# Patient Record
Sex: FEMALE | Race: BLACK OR AFRICAN AMERICAN | NOT HISPANIC OR LATINO | Employment: UNEMPLOYED | URBAN - METROPOLITAN AREA
[De-identification: names, ages, dates, MRNs, and addresses within clinical notes are randomized per-mention and may not be internally consistent; named-entity substitution may affect disease eponyms.]

---

## 2017-11-20 ENCOUNTER — TRANSCRIBE ORDERS (OUTPATIENT)
Dept: URGENT CARE | Facility: MEDICAL CENTER | Age: 22
End: 2017-11-20

## 2017-11-20 ENCOUNTER — APPOINTMENT (OUTPATIENT)
Dept: LAB | Facility: MEDICAL CENTER | Age: 22
End: 2017-11-20

## 2017-11-20 ENCOUNTER — APPOINTMENT (OUTPATIENT)
Dept: URGENT CARE | Facility: MEDICAL CENTER | Age: 22
End: 2017-11-20

## 2017-11-20 DIAGNOSIS — Z02.1 PHYSICAL EXAM, PRE-EMPLOYMENT: ICD-10-CM

## 2017-11-20 DIAGNOSIS — Z02.1 PHYSICAL EXAM, PRE-EMPLOYMENT: Primary | ICD-10-CM

## 2017-11-20 PROCEDURE — 86480 TB TEST CELL IMMUN MEASURE: CPT

## 2017-11-20 PROCEDURE — 36415 COLL VENOUS BLD VENIPUNCTURE: CPT

## 2017-11-22 LAB
ANNOTATION COMMENT IMP: NORMAL
GAMMA INTERFERON BACKGROUND BLD IA-ACNC: 0.04 IU/ML
M TB IFN-G BLD-IMP: NEGATIVE
M TB IFN-G CD4+ BCKGRND COR BLD-ACNC: 0.01 IU/ML
M TB IFN-G CD4+ T-CELLS BLD-ACNC: 0.05 IU/ML
MITOGEN IGNF BLD-ACNC: >10 IU/ML
QUANTIFERON-TB GOLD IN TUBE: NORMAL
SERVICE CMNT-IMP: NORMAL

## 2019-05-29 ENCOUNTER — HOSPITAL ENCOUNTER (EMERGENCY)
Facility: HOSPITAL | Age: 24
Discharge: HOME | End: 2019-05-29
Attending: EMERGENCY MEDICINE | Admitting: EMERGENCY MEDICINE

## 2019-05-29 VITALS
WEIGHT: 209.1 LBS | SYSTOLIC BLOOD PRESSURE: 127 MMHG | TEMPERATURE: 98.3 F | DIASTOLIC BLOOD PRESSURE: 70 MMHG | OXYGEN SATURATION: 100 % | RESPIRATION RATE: 20 BRPM | HEART RATE: 82 BPM

## 2019-05-29 DIAGNOSIS — N39.0 UTI (URINARY TRACT INFECTION): Primary | ICD-10-CM

## 2019-05-29 LAB
BACTERIA UR QL AUTO: ABNORMAL /HPF
BILIRUB UR QL STRIP: ABNORMAL
CLARITY UR: ABNORMAL
COLOR UR: ABNORMAL
GLUCOSE UR STRIP-MCNC: NEGATIVE MG/DL
HGB UR QL STRIP.AUTO: 250
KETONES UR STRIP-MCNC: NEGATIVE MG/DL
LEUKOCYTE ESTERASE UR QL STRIP: NEGATIVE
NITRITE UR QL STRIP: POSITIVE
NON-SQ EPI CELLS URNS QL MICRO: ABNORMAL /HPF
PH UR STRIP.AUTO: 5 [PH]
PROT UR STRIP-MCNC: >=500 MG/DL
RBC #/AREA URNS AUTO: ABNORMAL /HPF
SP GR UR STRIP.AUTO: 1.01 (ref 1–1.04)
UROBILINOGEN UA: 8 MG/DL
WBC #/AREA URNS AUTO: ABNORMAL /HPF

## 2019-05-29 PROCEDURE — 99283 EMERGENCY DEPT VISIT LOW MDM: CPT

## 2019-05-29 PROCEDURE — 81003 URINALYSIS AUTO W/O SCOPE: CPT | Performed by: PHYSICIAN ASSISTANT

## 2019-05-29 PROCEDURE — 81001 URINALYSIS AUTO W/SCOPE: CPT | Performed by: PHYSICIAN ASSISTANT

## 2019-05-29 PROCEDURE — 99283 EMERGENCY DEPT VISIT LOW MDM: CPT | Performed by: PHYSICIAN ASSISTANT

## 2019-05-29 RX ORDER — PAROXETINE 30 MG/1
30 TABLET, FILM COATED ORAL EVERY MORNING
COMMUNITY
End: 2019-09-05 | Stop reason: ALTCHOICE

## 2019-05-29 RX ORDER — NITROFURANTOIN 25; 75 MG/1; MG/1
100 CAPSULE ORAL 2 TIMES DAILY
Qty: 10 CAPSULE | Refills: 0 | Status: SHIPPED | OUTPATIENT
Start: 2019-05-29 | End: 2019-09-05 | Stop reason: ALTCHOICE

## 2019-09-05 ENCOUNTER — OFFICE VISIT (OUTPATIENT)
Dept: FAMILY MEDICINE CLINIC | Facility: CLINIC | Age: 24
End: 2019-09-05
Payer: COMMERCIAL

## 2019-09-05 VITALS
DIASTOLIC BLOOD PRESSURE: 70 MMHG | OXYGEN SATURATION: 98 % | SYSTOLIC BLOOD PRESSURE: 100 MMHG | WEIGHT: 198 LBS | HEART RATE: 88 BPM | HEIGHT: 66 IN | BODY MASS INDEX: 31.82 KG/M2 | TEMPERATURE: 99.7 F

## 2019-09-05 DIAGNOSIS — Z13.6 SCREENING FOR HYPERTENSION: ICD-10-CM

## 2019-09-05 DIAGNOSIS — Z13.29 SCREENING FOR THYROID DISORDER: ICD-10-CM

## 2019-09-05 DIAGNOSIS — Z23 NEED FOR TDAP VACCINATION: ICD-10-CM

## 2019-09-05 DIAGNOSIS — Z11.1 SCREENING FOR TUBERCULOSIS: ICD-10-CM

## 2019-09-05 DIAGNOSIS — Z13.220 ENCOUNTER FOR SCREENING FOR LIPID DISORDER: ICD-10-CM

## 2019-09-05 DIAGNOSIS — Z00.01 ENCOUNTER FOR WELL ADULT EXAM WITH ABNORMAL FINDINGS: Primary | ICD-10-CM

## 2019-09-05 DIAGNOSIS — Z13.0 SCREENING FOR IRON DEFICIENCY ANEMIA: ICD-10-CM

## 2019-09-05 DIAGNOSIS — Z23 NEED FOR INFLUENZA VACCINATION: ICD-10-CM

## 2019-09-05 PROBLEM — F41.9 ANXIETY: Status: ACTIVE | Noted: 2019-09-05

## 2019-09-05 PROCEDURE — 90472 IMMUNIZATION ADMIN EACH ADD: CPT | Performed by: FAMILY MEDICINE

## 2019-09-05 PROCEDURE — 90471 IMMUNIZATION ADMIN: CPT | Performed by: FAMILY MEDICINE

## 2019-09-05 PROCEDURE — 90682 RIV4 VACC RECOMBINANT DNA IM: CPT | Performed by: FAMILY MEDICINE

## 2019-09-05 PROCEDURE — 99385 PREV VISIT NEW AGE 18-39: CPT | Performed by: FAMILY MEDICINE

## 2019-09-05 PROCEDURE — 90715 TDAP VACCINE 7 YRS/> IM: CPT | Performed by: FAMILY MEDICINE

## 2019-09-05 NOTE — PROGRESS NOTES
FAMILY [facility] HEALTH MAINTENANCE OFFICE VISIT  [facility] [place] [facility]    NAME: Suyapa Isabel  AGE: 22 y o  SEX: female  : 1995     DATE: 2019    Assessment and Plan     Problem List Items Addressed This Visit        Other    Encounter for well adult exam with abnormal findings - Primary     Advice and education were given regarding nutrition, aerobic exercises, weight bearing exercises, cardiovascular risk reduction, fall risk reduction, and age appropriate supplements  The patient was counseled regarding instructions for management, risk factor reductions, prognosis, risks and benefits of treatment options, patient and family education, and importance of compliance with treatment  Patient will have a Tdap the and flu shot today possible side effect discussed with the patient         Relevant Orders    CBC and differential    Basic metabolic panel    Lipid Panel with Direct LDL reflex    TSH, 3rd generation with Free T4 reflex    BMI 32 0-32 9,adult     The BMI is above average  BMI counseling and education was provided to the patient  Nutrition recommendations include reducing portion sizes, decreasing overall calorie intake, 3-5 servings of fruits/vegetables daily, reducing fast food intake, consuming healthier snacks, decreasing soda and/or juice intake, moderation in carbohydrate intake and reducing intake of saturated fat and trans fat  Exercise recommendations include moderate aerobic physical activity for 150 minutes/week, exercising 3-5 times per week and joining a gym           Relevant Orders    CBC and differential    Basic metabolic panel    Lipid Panel with Direct LDL reflex    TSH, 3rd generation with Free T4 reflex      Other Visit Diagnoses     Screening for tuberculosis        Relevant Orders    Quantiferon TB Gold Plus    Screening for thyroid disorder        Relevant Orders    CBC and differential    Basic metabolic panel Lipid Panel with Direct LDL reflex    TSH, 3rd generation with Free T4 reflex    Screening for iron deficiency anemia        Relevant Orders    CBC and differential    Basic metabolic panel    Lipid Panel with Direct LDL reflex    TSH, 3rd generation with Free T4 reflex    Screening for hypertension        Relevant Orders    CBC and differential    Basic metabolic panel    Lipid Panel with Direct LDL reflex    TSH, 3rd generation with Free T4 reflex    Encounter for screening for lipid disorder        Relevant Orders    CBC and differential    Basic metabolic panel    Lipid Panel with Direct LDL reflex    TSH, 3rd generation with Free T4 reflex    Need for Tdap vaccination        Relevant Orders    TDAP VACCINE GREATER THAN OR EQUAL TO 8YO IM (Completed)    Need for influenza vaccination        Relevant Orders    PREFERRED: influenza vaccine, [id number], quadrivalent, recombinant, PF, 0 5 mL (FLUBLOK) (Completed)            · Patient Counseling:   · Nutrition: Stressed importance of a well balanced diet, moderation of sodium/saturated fat, caloric balance and sufficient intake of fiber  · Exercise: Stressed the importance of regular exercise with a goal of 150 minutes per week  · Dental Health: Discussed daily flossing and brushing and regular dental visits     · Immunizations reviewed: Risks and Benefits discussed  · Discussed benefits of:  Cervical Cancer screening and Screening labs   BMI Counseling: Body mass index is 32 45 kg/m²  Discussed with patient's BMI with her       Chief Complaint     Chief Complaint   Patient presents with    Physical Exam       History of Present Illness     Patient here for annual physical exam deny any chest pain short of breath no palpitation no headache no blurred vision no weakness or lateralized of the symptom no abdomen pain nausea vomiting or diarrhea no renal problem no fever no change in the weight and no change in the mood no cough no wheezing deny smoking she does do exercises twice a months and she does not do any special diet      Well Adult Physical   Patient here for a comprehensive physical exam       Diet and Physical Activity  Diet: Regular diet  Exercise: intermittently      Depression Screen  PHQ-9 Depression Screening    PHQ-9:    Frequency of the following problems over the past two weeks:       Little interest or pleasure in doing things:  0 - not at all  Feeling down, depressed, or hopeless:  0 - not at all  PHQ-2 Score:  0          General Health  Hearing: Normal:  bilateral  Vision: no vision problems  Dental: no dental visits for >1 year and brushes teeth once daily    Reproductive Health  Regular Periods      The following portions of the patient's history were reviewed and updated as appropriate: allergies, current medications, past family history, past medical history, past social history, past surgical history and problem list     Review of Systems     Review of Systems   Constitutional: Negative for fatigue and fever  HENT: Negative for ear pain, sinus pressure, sinus pain and sore throat  Eyes: Negative for pain and redness  Respiratory: Negative for cough, chest tightness and shortness of breath  Cardiovascular: Negative for chest pain, palpitations and leg swelling  Gastrointestinal: Negative for abdominal pain, blood in stool, constipation, diarrhea and nausea  Endocrine: Negative for heat intolerance and polydipsia  Genitourinary: Negative for flank pain, frequency and hematuria  Musculoskeletal: Negative for back pain and joint swelling  Skin: Negative for rash  Neurological: Negative for dizziness, numbness and headaches  Hematological: Does not bruise/bleed easily  Psychiatric/Behavioral: Negative for agitation and behavioral problems         Past Medical History     Past Medical History:   Diagnosis Date    Anxiety 9/5/2019    Obesity        Past Surgical History     Past Surgical History:   Procedure Laterality Date    ELBOW SURGERY Right        Social History     Social History     Socioeconomic History    Marital status: Single     Spouse name: None    Number of children: None    Years of education: None    Highest education level: None   Occupational History    None   Social Needs    Financial resource strain: Not hard at all   Eliud-Liza insecurity:     Worry: Never true     Inability: Never true    Transportation needs:     Medical: No     Non-medical: No   Tobacco Use    Smoking status: Never Smoker    Smokeless tobacco: Never Used   Substance and Sexual Activity    Alcohol use: Yes     Frequency: Never     Comment: socially    Drug use: Never    Sexual activity: Yes     Partners: Male   Lifestyle    Physical activity:     Days per week: 0 days     Minutes per session: 0 min    Stress: Rather much   Relationships    Social connections:     Talks on phone: More than three times a week     Gets together: Once a week     Attends Rastafari service: Never     Active member of club or organization: No     Attends meetings of clubs or organizations: Never     Relationship status: Never     Intimate partner violence:     Fear of current or ex partner: No     Emotionally abused: No     Physically abused: No     Forced sexual activity: No   Other Topics Concern    None   Social History Narrative    None       Family History     History reviewed  No pertinent family history  Current Medications     No current outpatient medications on file  Allergies     No Known Allergies    Objective     /70   Pulse 88   Temp 99 7 °F (37 6 °C) (Tympanic)   Ht 5' 5 5" (1 664 m)   Wt 89 8 kg (198 lb)   LMP 08/15/2019 (Approximate)   SpO2 98%   Breastfeeding? No   BMI 32 45 kg/m²      Physical Exam   Constitutional: She is oriented to person, place, and time  She appears well-developed and well-nourished  HENT:   Head: Normocephalic     Right Ear: External ear normal    Left Ear: External ear normal    Eyes: Conjunctivae and EOM are normal  Right eye exhibits no discharge  Left eye exhibits no discharge  Neck: No JVD present  Cardiovascular: Normal rate, regular rhythm and normal heart sounds  Exam reveals no gallop  No murmur heard  Pulmonary/Chest: Effort normal  No respiratory distress  She has no wheezes  She has no rales  She exhibits no tenderness  Abdominal: She exhibits no mass  There is no tenderness  There is no rebound  Musculoskeletal: She exhibits no edema or tenderness  Neurological: She is alert and oriented to person, place, and time  Skin: No rash noted  No erythema  Psychiatric: She has a normal mood and affect  Jose Estrada MD  [place] [facility]  BMI Counseling: Body mass index is 32 45 kg/m²  Discussed the patient's BMI with her  The BMI is above average  BMI counseling and education was provided to the patient  Nutrition recommendations include reducing portion sizes, decreasing overall calorie intake, 3-5 servings of fruits/vegetables daily and reducing fast food intake  Exercise recommendations include moderate aerobic physical activity for 150 minutes/week

## 2019-09-05 NOTE — PATIENT INSTRUCTIONS

## 2019-09-05 NOTE — ASSESSMENT & PLAN NOTE
Advice and education were given regarding nutrition, aerobic exercises, weight bearing exercises, cardiovascular risk reduction, fall risk reduction, and age appropriate supplements  The patient was counseled regarding instructions for management, risk factor reductions, prognosis, risks and benefits of treatment options, patient and family education, and importance of compliance with treatment       Patient will have a Tdap the and flu shot today possible side effect discussed with the patient

## 2019-09-20 ENCOUNTER — TRANSCRIBE ORDERS (OUTPATIENT)
Dept: LAB | Facility: CLINIC | Age: 24
End: 2019-09-20

## 2019-09-20 ENCOUNTER — APPOINTMENT (OUTPATIENT)
Dept: LAB | Facility: CLINIC | Age: 24
End: 2019-09-20
Payer: COMMERCIAL

## 2019-09-20 DIAGNOSIS — Z13.6 SCREENING FOR HYPERTENSION: ICD-10-CM

## 2019-09-20 DIAGNOSIS — Z13.29 SCREENING FOR THYROID DISORDER: ICD-10-CM

## 2019-09-20 DIAGNOSIS — Z11.1 SCREENING FOR TUBERCULOSIS: ICD-10-CM

## 2019-09-20 DIAGNOSIS — Z13.220 ENCOUNTER FOR SCREENING FOR LIPID DISORDER: ICD-10-CM

## 2019-09-20 DIAGNOSIS — Z13.0 SCREENING FOR IRON DEFICIENCY ANEMIA: ICD-10-CM

## 2019-09-20 DIAGNOSIS — Z00.01 ENCOUNTER FOR WELL ADULT EXAM WITH ABNORMAL FINDINGS: ICD-10-CM

## 2019-09-20 LAB
ANION GAP SERPL CALCULATED.3IONS-SCNC: 5 MMOL/L (ref 4–13)
BASOPHILS # BLD AUTO: 0.02 THOUSANDS/ΜL (ref 0–0.1)
BASOPHILS NFR BLD AUTO: 0 % (ref 0–1)
BUN SERPL-MCNC: 21 MG/DL (ref 5–25)
CALCIUM SERPL-MCNC: 9.7 MG/DL (ref 8.3–10.1)
CHLORIDE SERPL-SCNC: 108 MMOL/L (ref 100–108)
CHOLEST SERPL-MCNC: 137 MG/DL (ref 50–200)
CO2 SERPL-SCNC: 27 MMOL/L (ref 21–32)
CREAT SERPL-MCNC: 0.8 MG/DL (ref 0.6–1.3)
EOSINOPHIL # BLD AUTO: 0.06 THOUSAND/ΜL (ref 0–0.61)
EOSINOPHIL NFR BLD AUTO: 1 % (ref 0–6)
ERYTHROCYTE [DISTWIDTH] IN BLOOD BY AUTOMATED COUNT: 12 % (ref 11.6–15.1)
GFR SERPL CREATININE-BSD FRML MDRD: 120 ML/MIN/1.73SQ M
GLUCOSE P FAST SERPL-MCNC: 82 MG/DL (ref 65–99)
HCT VFR BLD AUTO: 40.2 % (ref 34.8–46.1)
HDLC SERPL-MCNC: 56 MG/DL (ref 40–60)
HGB BLD-MCNC: 13 G/DL (ref 11.5–15.4)
IMM GRANULOCYTES # BLD AUTO: 0.01 THOUSAND/UL (ref 0–0.2)
IMM GRANULOCYTES NFR BLD AUTO: 0 % (ref 0–2)
LDLC SERPL CALC-MCNC: 74 MG/DL (ref 0–100)
LYMPHOCYTES # BLD AUTO: 2.39 THOUSANDS/ΜL (ref 0.6–4.47)
LYMPHOCYTES NFR BLD AUTO: 37 % (ref 14–44)
MCH RBC QN AUTO: 30.2 PG (ref 26.8–34.3)
MCHC RBC AUTO-ENTMCNC: 32.3 G/DL (ref 31.4–37.4)
MCV RBC AUTO: 93 FL (ref 82–98)
MONOCYTES # BLD AUTO: 0.41 THOUSAND/ΜL (ref 0.17–1.22)
MONOCYTES NFR BLD AUTO: 6 % (ref 4–12)
NEUTROPHILS # BLD AUTO: 3.53 THOUSANDS/ΜL (ref 1.85–7.62)
NEUTS SEG NFR BLD AUTO: 56 % (ref 43–75)
NRBC BLD AUTO-RTO: 0 /100 WBCS
PLATELET # BLD AUTO: 266 THOUSANDS/UL (ref 149–390)
PMV BLD AUTO: 10.5 FL (ref 8.9–12.7)
POTASSIUM SERPL-SCNC: 4.2 MMOL/L (ref 3.5–5.3)
RBC # BLD AUTO: 4.31 MILLION/UL (ref 3.81–5.12)
SODIUM SERPL-SCNC: 140 MMOL/L (ref 136–145)
TRIGL SERPL-MCNC: 36 MG/DL
TSH SERPL DL<=0.05 MIU/L-ACNC: 1.09 UIU/ML (ref 0.36–3.74)
WBC # BLD AUTO: 6.42 THOUSAND/UL (ref 4.31–10.16)

## 2019-09-20 PROCEDURE — 36415 COLL VENOUS BLD VENIPUNCTURE: CPT

## 2019-09-20 PROCEDURE — 85025 COMPLETE CBC W/AUTO DIFF WBC: CPT

## 2019-09-20 PROCEDURE — 86480 TB TEST CELL IMMUN MEASURE: CPT

## 2019-09-20 PROCEDURE — 80048 BASIC METABOLIC PNL TOTAL CA: CPT

## 2019-09-20 PROCEDURE — 80061 LIPID PANEL: CPT

## 2019-09-20 PROCEDURE — 84443 ASSAY THYROID STIM HORMONE: CPT

## 2019-09-23 LAB
GAMMA INTERFERON BACKGROUND BLD IA-ACNC: 0.02 IU/ML
M TB IFN-G BLD-IMP: NEGATIVE
M TB IFN-G CD4+ BCKGRND COR BLD-ACNC: 0 IU/ML
M TB IFN-G CD4+ BCKGRND COR BLD-ACNC: 0.04 IU/ML
MITOGEN IGNF BCKGRD COR BLD-ACNC: >10 IU/ML

## 2019-09-25 ENCOUNTER — OFFICE VISIT (OUTPATIENT)
Dept: FAMILY MEDICINE CLINIC | Facility: CLINIC | Age: 24
End: 2019-09-25
Payer: COMMERCIAL

## 2019-09-25 VITALS
HEART RATE: 73 BPM | RESPIRATION RATE: 16 BRPM | OXYGEN SATURATION: 98 % | WEIGHT: 200 LBS | HEIGHT: 66 IN | BODY MASS INDEX: 32.14 KG/M2 | DIASTOLIC BLOOD PRESSURE: 74 MMHG | TEMPERATURE: 97.8 F | SYSTOLIC BLOOD PRESSURE: 114 MMHG

## 2019-09-25 DIAGNOSIS — L42 PITYRIASIS ROSEA: Primary | ICD-10-CM

## 2019-09-25 DIAGNOSIS — F41.9 ANXIETY: ICD-10-CM

## 2019-09-25 DIAGNOSIS — Z11.8 SCREENING FOR CHLAMYDIAL DISEASE: ICD-10-CM

## 2019-09-25 PROCEDURE — 99214 OFFICE O/P EST MOD 30 MIN: CPT | Performed by: FAMILY MEDICINE

## 2019-09-25 PROCEDURE — 87591 N.GONORRHOEAE DNA AMP PROB: CPT | Performed by: FAMILY MEDICINE

## 2019-09-25 PROCEDURE — 87491 CHLMYD TRACH DNA AMP PROBE: CPT | Performed by: FAMILY MEDICINE

## 2019-09-25 RX ORDER — FLUOXETINE 10 MG/1
10 CAPSULE ORAL DAILY
Qty: 30 CAPSULE | Refills: 1 | Status: SHIPPED | OUTPATIENT
Start: 2019-09-25 | End: 2019-11-01 | Stop reason: SDDI

## 2019-09-25 NOTE — PATIENT INSTRUCTIONS
Pityriasis rosea   WHAT YOU NEED TO KNOW:   What is Pityriasis rosea? Pityriasis rosea is a skin disorder that causes a scaly rash  The cause of Pityriasis rosea is not known  It usually goes away on its own in 2 to 12 weeks  Pityriasis rosea most often occurs in people who are 8to 28years old and during pregnancy  What are the signs and symptoms of Pityriasis rosea? The rash first appears as a single pink patch on the chest or back  In people who have dark skin, the color may be violet or dark gray  Within 90 days of the first patch, the rash spreads to the rest of the torso  The rash can also spread to the neck, arms, and legs  Some people with Pityriasis rosea have mild to moderate itching  How is Pityriasis rosea diagnosed? Your healthcare provider will examine your rash and ask about your symptoms  He may take a sample of your skin to check for a fungal infection  How is Pityriasis rosea treated? Your healthcare provider may prescribe antihistamines or steroids to help reduce itching  They may be given as a pill or cream  Severe cases may be treated with ultraviolet light therapy  How can I manage my symptoms? Heat may irritate your skin and cause itching  Avoid hot showers and physical activity that may make your skin too warm  When should I contact my healthcare provider? · Your rash does not improve within 10 weeks  · Your itching becomes worse  · Your rash becomes more red and you have fever  CARE AGREEMENT:   You have the right to help plan your care  Learn about your health condition and how it may be treated  Discuss treatment options with your caregivers to decide what care you want to receive  You always have the right to refuse treatment  The above information is an  only  It is not intended as medical advice for individual conditions or treatments   Talk to your doctor, nurse or pharmacist before following any medical regimen to see if it is safe and effective for you  © 2017 [facility] Information is for End User's use only and may not be sold, redistributed or otherwise used for commercial purposes  All illustrations and images included in CareNotes® are the copyrighted property of A D A M [employer] Inc  or [employer]

## 2019-09-25 NOTE — ASSESSMENT & PLAN NOTE
The symptomatic recurrent will start the patient on Prozac 10 mg once a day proper use of medication possible side effect discussed with the patient the patient already been seen by therapist per patient we encouraged her to continue

## 2019-09-25 NOTE — ASSESSMENT & PLAN NOTE
A new diagnosis will recommend to apply hydrocortisone cream twice a day for 10 days the handout has been given to the patient about the not sure of the disease possible side effect of the medication discussed with the patient

## 2019-09-25 NOTE — PROGRESS NOTES
Subjective:   Chief Complaint   Patient presents with    Follow-up     Review labs        Patient ID: Jose A Farias is a 22 y o  female  Patient had a and office had multiple concern she had a concerned about the rash in her back start as a 1 lesion on the lower back and after that start spread up to her shoulder she describe it as doc spot is not itchy not painful but it bother her the look of it and the and no other rash in her body no joint pain no joint swelling no abdomen pain no nausea no vomiting no new medication no blood in the urine  Also patient who known to have history of an anxiety she did diagnosed with that 5 years ago and she was on medication felt better and stop it but now recently she is feeling more stressed out and the feel panicky at the time deny any and chest pain no palpitation but she get shaky tremor and the a sweating with this happened and without any exacerbating factor she is not smoker she does not use any drugs and this affect her sleeping her appetite and her quality of the life deny any suicide attempt or ideation      The following portions of the patient's history were reviewed and updated as appropriate: allergies, current medications, past family history, past medical history, past social history, past surgical history and problem list     Review of Systems   Constitutional: Negative for fatigue and fever  HENT: Negative for ear pain, sinus pressure, sinus pain and sore throat  Eyes: Negative for pain and redness  Respiratory: Negative for cough, chest tightness and shortness of breath  Cardiovascular: Negative for chest pain, palpitations and leg swelling  Gastrointestinal: Negative for abdominal pain, blood in stool, constipation, diarrhea and nausea  Genitourinary: Negative for flank pain, frequency and hematuria  Musculoskeletal: Negative for back pain and joint swelling  Skin: Positive for rash     Neurological: Negative for dizziness, numbness and headaches  Hematological: Does not bruise/bleed easily  Psychiatric/Behavioral: Positive for sleep disturbance  Negative for agitation, decreased concentration, self-injury and suicidal ideas  The patient is nervous/anxious  Objective:  Vitals:    09/25/19 0909   BP: 114/74   BP Location: Left arm   Patient Position: Sitting   Cuff Size: Large   Pulse: 73   Resp: 16   Temp: 97 8 °F (36 6 °C)   TempSrc: Tympanic   SpO2: 98%   Weight: 90 7 kg (200 lb)   Height: 5' 5 5" (1 664 m)      Physical Exam   Constitutional: She is oriented to person, place, and time  She appears well-developed and well-nourished  HENT:   Head: Normocephalic  Right Ear: External ear normal    Left Ear: External ear normal    Eyes: Conjunctivae and EOM are normal  Right eye exhibits no discharge  Left eye exhibits no discharge  Neck: No JVD present  Cardiovascular: Normal rate, regular rhythm and normal heart sounds  Exam reveals no gallop  No murmur heard  Pulmonary/Chest: Effort normal  No respiratory distress  She has no wheezes  She has no rales  She exhibits no tenderness  Abdominal: She exhibits no mass  There is no tenderness  There is no rebound  Musculoskeletal: She exhibits no edema or tenderness  Neurological: She is alert and oriented to person, place, and time  Skin: No rash noted  No erythema                Assessment/Plan:    Pityriasis rosea  A new diagnosis will recommend to apply hydrocortisone cream twice a day for 10 days the handout has been given to the patient about the not sure of the disease possible side effect of the medication discussed with the patient    Anxiety  The symptomatic recurrent will start the patient on Prozac 10 mg once a day proper use of medication possible side effect discussed with the patient the patient already been seen by therapist per patient we encouraged her to continue       Diagnoses and all orders for this visit:    Pityriasis rosea  -     hydrocortisone 2 5 % cream; Apply topically 2 (two) times a day    Anxiety  -     FLUoxetine (PROzac) 10 mg capsule;  Take 1 capsule (10 mg total) by mouth daily    Screening for chlamydial disease  -     Chlamydia/GC amplified DNA by PCR

## 2019-09-26 LAB
C TRACH DNA SPEC QL NAA+PROBE: NEGATIVE
N GONORRHOEA DNA SPEC QL NAA+PROBE: NEGATIVE

## 2019-11-01 ENCOUNTER — OFFICE VISIT (OUTPATIENT)
Dept: FAMILY MEDICINE CLINIC | Facility: CLINIC | Age: 24
End: 2019-11-01
Payer: COMMERCIAL

## 2019-11-01 VITALS
DIASTOLIC BLOOD PRESSURE: 78 MMHG | WEIGHT: 199 LBS | HEART RATE: 62 BPM | SYSTOLIC BLOOD PRESSURE: 118 MMHG | TEMPERATURE: 99.4 F | HEIGHT: 66 IN | BODY MASS INDEX: 31.98 KG/M2 | OXYGEN SATURATION: 99 %

## 2019-11-01 DIAGNOSIS — F41.9 ANXIETY: Primary | ICD-10-CM

## 2019-11-01 DIAGNOSIS — L42 PITYRIASIS ROSEA: ICD-10-CM

## 2019-11-01 PROCEDURE — 3008F BODY MASS INDEX DOCD: CPT | Performed by: FAMILY MEDICINE

## 2019-11-01 PROCEDURE — 99213 OFFICE O/P EST LOW 20 MIN: CPT | Performed by: FAMILY MEDICINE

## 2019-11-01 RX ORDER — PAROXETINE HYDROCHLORIDE 20 MG/1
20 TABLET, FILM COATED ORAL DAILY
Qty: 30 TABLET | Refills: 1 | Status: SHIPPED | OUTPATIENT
Start: 2019-11-01 | End: 2020-01-08 | Stop reason: SDUPTHER

## 2019-11-01 NOTE — ASSESSMENT & PLAN NOTE
Chronic recurrent patient did stop the Prozac it did not help her at old patient did recall she had the Paxil before and it help her we will prescribe the Paxil 20 mg once a day proper use of medication possible side effect discussed with the patient will refer the patient to see psychiatric

## 2019-11-01 NOTE — PROGRESS NOTES
Subjective:   Chief Complaint   Patient presents with    Follow-up     chronic conditions        Patient ID: Thalia Rios is a 22 y o  female  Patient here follow-up with a chronic condition patient who had the history of an anxiety and the symptom recurrent the we did start the patient on  Prozac the patient to the medication for 2 week and she stop it it feel did not help her at old she still have the anxiety mood affect her sleeping her appetite deny any suicide attempt or idea the per patient she did remember when she had this previously they gave her Paxil and it did help her and she would like to try it again  Recent blood work discussed with the patient      The following portions of the patient's history were reviewed and updated as appropriate: allergies, current medications, past family history, past medical history, past social history, past surgical history and problem list     Review of Systems   Constitutional: Negative for fatigue and fever  HENT: Negative for ear pain, sinus pressure, sinus pain and sore throat  Eyes: Negative for pain and redness  Respiratory: Negative for cough, chest tightness and shortness of breath  Cardiovascular: Negative for chest pain, palpitations and leg swelling  Gastrointestinal: Negative for abdominal pain, blood in stool, constipation, diarrhea and nausea  Genitourinary: Negative for flank pain, frequency and hematuria  Musculoskeletal: Negative for back pain and joint swelling  Skin: Negative for rash  Neurological: Negative for dizziness, numbness and headaches  Hematological: Does not bruise/bleed easily  Psychiatric/Behavioral: Positive for agitation and decreased concentration  Negative for self-injury and suicidal ideas  The patient is nervous/anxious            Objective:  Vitals:    11/01/19 0928   BP: 118/78   BP Location: Left arm   Patient Position: Sitting   Cuff Size: Large   Pulse: 62   Temp: 99 4 °F (37 4 °C)   TempSrc: Tympanic   SpO2: 99%   Weight: 90 3 kg (199 lb)   Height: 5' 5 5" (1 664 m)      Physical Exam   Constitutional: She is oriented to person, place, and time  She appears well-developed and well-nourished  HENT:   Head: Normocephalic  Right Ear: External ear normal    Left Ear: External ear normal    Eyes: Conjunctivae and EOM are normal  Right eye exhibits no discharge  Left eye exhibits no discharge  Neck: No JVD present  Cardiovascular: Normal rate, regular rhythm and normal heart sounds  Exam reveals no gallop  No murmur heard  Pulmonary/Chest: Effort normal  No respiratory distress  She has no wheezes  She has no rales  She exhibits no tenderness  Abdominal: She exhibits no mass  There is no tenderness  There is no rebound  Musculoskeletal: She exhibits no edema or tenderness  Neurological: She is alert and oriented to person, place, and time  Skin: No rash noted  No erythema  Assessment/Plan:    Anxiety  Chronic recurrent patient did stop the Prozac it did not help her at old patient did recall she had the Paxil before and it help her we will prescribe the Paxil 20 mg once a day proper use of medication possible side effect discussed with the patient will refer the patient to see psychiatric       Diagnoses and all orders for this visit:    Anxiety  -     PARoxetine (PAXIL) 20 mg tablet; Take 1 tablet (20 mg total) by mouth daily  -     Ambulatory referral to Psychiatry;  Future    Pityriasis rosea  -     hydrocortisone 2 5 % cream; Apply topically 2 (two) times a day

## 2020-01-08 DIAGNOSIS — F41.9 ANXIETY: ICD-10-CM

## 2020-01-08 RX ORDER — PAROXETINE HYDROCHLORIDE 20 MG/1
20 TABLET, FILM COATED ORAL DAILY
Qty: 30 TABLET | Refills: 2 | Status: SHIPPED | OUTPATIENT
Start: 2020-01-08 | End: 2020-06-12 | Stop reason: SDUPTHER

## 2020-01-24 DIAGNOSIS — L42 PITYRIASIS ROSEA: ICD-10-CM

## 2020-06-12 ENCOUNTER — OFFICE VISIT (OUTPATIENT)
Dept: FAMILY MEDICINE CLINIC | Facility: CLINIC | Age: 25
End: 2020-06-12

## 2020-06-12 DIAGNOSIS — F41.9 ANXIETY: Primary | ICD-10-CM

## 2020-06-12 DIAGNOSIS — L42 PITYRIASIS ROSEA: ICD-10-CM

## 2020-06-12 DIAGNOSIS — E55.9 VITAMIN D DEFICIENCY: ICD-10-CM

## 2020-06-12 PROCEDURE — 1036F TOBACCO NON-USER: CPT | Performed by: FAMILY MEDICINE

## 2020-06-12 PROCEDURE — 99214 OFFICE O/P EST MOD 30 MIN: CPT | Performed by: FAMILY MEDICINE

## 2020-06-12 RX ORDER — BUSPIRONE HYDROCHLORIDE 10 MG/1
15 TABLET ORAL 4 TIMES DAILY
COMMUNITY
Start: 2020-04-02 | End: 2021-09-14 | Stop reason: DRUGHIGH

## 2020-06-12 RX ORDER — CHLORAL HYDRATE 500 MG
1000 CAPSULE ORAL 2 TIMES DAILY
COMMUNITY
End: 2020-09-10 | Stop reason: ALTCHOICE

## 2020-06-12 RX ORDER — ERGOCALCIFEROL 1.25 MG/1
CAPSULE ORAL
COMMUNITY
Start: 2020-03-05 | End: 2020-09-10 | Stop reason: ALTCHOICE

## 2020-06-12 RX ORDER — CLONAZEPAM 0.25 MG/1
0.5 TABLET, ORALLY DISINTEGRATING ORAL 4 TIMES DAILY
COMMUNITY
Start: 2020-04-03 | End: 2021-09-14 | Stop reason: DRUGHIGH

## 2020-06-14 VITALS
WEIGHT: 215 LBS | DIASTOLIC BLOOD PRESSURE: 80 MMHG | SYSTOLIC BLOOD PRESSURE: 120 MMHG | TEMPERATURE: 99.3 F | HEIGHT: 65 IN | HEART RATE: 76 BPM | OXYGEN SATURATION: 98 % | BODY MASS INDEX: 35.82 KG/M2

## 2020-06-14 PROBLEM — E55.9 VITAMIN D DEFICIENCY: Status: ACTIVE | Noted: 2020-06-14

## 2020-06-14 PROBLEM — E55.9 VITAMIN D DEFICIENCY: Status: ACTIVE | Noted: 2020-06-12

## 2020-08-07 ENCOUNTER — TELEPHONE (OUTPATIENT)
Dept: FAMILY MEDICINE CLINIC | Facility: CLINIC | Age: 25
End: 2020-08-07

## 2020-08-07 DIAGNOSIS — Z11.1 SCREENING FOR TUBERCULOSIS: Primary | ICD-10-CM

## 2020-08-07 DIAGNOSIS — Z13.0 SCREENING FOR IRON DEFICIENCY ANEMIA: ICD-10-CM

## 2020-08-07 DIAGNOSIS — Z13.29 SCREENING FOR THYROID DISORDER: ICD-10-CM

## 2020-08-07 DIAGNOSIS — E55.9 VITAMIN D DEFICIENCY: ICD-10-CM

## 2020-08-07 DIAGNOSIS — Z13.6 SCREENING FOR HYPERTENSION: ICD-10-CM

## 2020-09-10 ENCOUNTER — OFFICE VISIT (OUTPATIENT)
Dept: FAMILY MEDICINE CLINIC | Facility: CLINIC | Age: 25
End: 2020-09-10

## 2020-09-10 VITALS
DIASTOLIC BLOOD PRESSURE: 70 MMHG | TEMPERATURE: 99.6 F | BODY MASS INDEX: 33.99 KG/M2 | HEIGHT: 65 IN | HEART RATE: 80 BPM | OXYGEN SATURATION: 98 % | SYSTOLIC BLOOD PRESSURE: 110 MMHG | WEIGHT: 204 LBS

## 2020-09-10 DIAGNOSIS — Z23 NEED FOR INFLUENZA VACCINATION: ICD-10-CM

## 2020-09-10 DIAGNOSIS — Z00.01 ENCOUNTER FOR WELL ADULT EXAM WITH ABNORMAL FINDINGS: Primary | ICD-10-CM

## 2020-09-10 DIAGNOSIS — E55.9 VITAMIN D DEFICIENCY: ICD-10-CM

## 2020-09-10 DIAGNOSIS — Z11.1 SCREENING-PULMONARY TB: ICD-10-CM

## 2020-09-10 PROCEDURE — 99395 PREV VISIT EST AGE 18-39: CPT | Performed by: FAMILY MEDICINE

## 2020-09-10 PROCEDURE — 90471 IMMUNIZATION ADMIN: CPT

## 2020-09-10 PROCEDURE — 90686 IIV4 VACC NO PRSV 0.5 ML IM: CPT

## 2020-09-10 RX ORDER — MULTIVIT-MIN/IRON/FOLIC ACID/K 18-600-40
2000 CAPSULE ORAL DAILY
Qty: 30 CAPSULE | Refills: 3 | Status: SHIPPED | OUTPATIENT
Start: 2020-09-10 | End: 2020-12-02 | Stop reason: SDUPTHER

## 2020-09-10 NOTE — ASSESSMENT & PLAN NOTE
A chronic improved compared with before encouraged patient to continue with the portion control low carb low-fat diet and increased physical activity

## 2020-09-10 NOTE — ASSESSMENT & PLAN NOTE
Chronic uncontrolled continue with vitamin-D 2000 International Units once a day proper use and possible side effect discussed the patient

## 2020-09-10 NOTE — ASSESSMENT & PLAN NOTE
Advice and education were given regarding nutrition, aerobic exercises, weight bearing exercises, cardiovascular risk reduction, fall risk reduction, and age appropriate supplements  The patient was counseled regarding instructions for management, risk factor reductions, prognosis, risks and benefits of treatment options, patient and family education, and importance of compliance with treatment       A discussed with the patient flu shot she agree possible side effect review with her

## 2020-09-10 NOTE — PROGRESS NOTES
BMI Counseling: Body mass index is 33 95 kg/m²  The BMI is above normal  Nutrition recommendations include decreasing portion sizes, consuming healthier snacks and limiting drinks that contain sugar  Exercise recommendations include exercising 3-5 times per week  FAMILY PRACTICE HEALTH MAINTENANCE OFFICE VISIT  [facility] [place] [facility]    NAME: Blayne Isabel  AGE: 26 y o  SEX: female  : 1995     DATE: 9/10/2020    Assessment and Plan     1  Encounter for well adult exam with abnormal findings  Assessment & Plan:  Advice and education were given regarding nutrition, aerobic exercises, weight bearing exercises, cardiovascular risk reduction, fall risk reduction, and age appropriate supplements  The patient was counseled regarding instructions for management, risk factor reductions, prognosis, risks and benefits of treatment options, patient and family education, and importance of compliance with treatment  A discussed with the patient flu shot she agree possible side effect review with her      2  BMI 33 0-33 9,adult  Assessment & Plan:  A chronic improved compared with before encouraged patient to continue with the portion control low carb low-fat diet and increased physical activity      3  Vitamin D deficiency  Assessment & Plan:  Chronic uncontrolled continue with vitamin-D 2000 International Units once a day proper use and possible side effect discussed the patient    Orders:  -     Vitamin D, Cholecalciferol, 50 MCG (2000 UT) CAPS; Take 2,000 Int'l Units by mouth daily    4  Screening-pulmonary TB  -     Quantiferon TB Gold Plus; Future    5   Need for influenza vaccination  -     FLUZONE: influenza vaccine, quadrivalent, 0 5 mL      · Patient Counseling:   · Nutrition: Stressed importance of a well balanced diet, moderation of sodium/saturated fat, caloric balance and sufficient intake of fiber  · Exercise: Stressed the importance of regular exercise with a goal of 150 minutes per week  · Dental Health: Discussed daily flossing and brushing and regular dental visits     · Immunizations reviewed: Risks and Benefits discussed  · Discussed benefits of:  Cervical Cancer screening and Screening labs   BMI Counseling: Body mass index is 33 95 kg/m²  Discussed with patient's BMI with her  Chief Complaint     Chief Complaint   Patient presents with    Physical Exam       History of Present Illness     Patient here for annual physical exam she deny any chest pain short of breath no palpitation no cough no wheezing no hematosis deny any headache blurred vision no weakness or lateralized the symptom no abdomen pain nausea vomiting or diarrhea no renal problem no rash no fever no change in the weight and no change in the mood deny smoking does not do any special diet does not do exercise regularly recent blood work discussed with the patient      Well Adult Physical   Patient here for a comprehensive physical exam       Diet and Physical Activity  Diet: Regular diet  Exercise: rarely      Depression Screen  PHQ-9 Depression Screening    PHQ-9:    Frequency of the following problems over the past two weeks:       Little interest or pleasure in doing things:  0 - not at all  Feeling down, depressed, or hopeless:  0 - not at all  PHQ-2 Score:  0          General Health  Hearing: Normal:  bilateral  Vision: no vision problems  Dental: brushes teeth twice daily    Reproductive Health  No issues  and Regular Periods      The following portions of the patient's history were reviewed and updated as appropriate: allergies, current medications, past family history, past medical history, past social history, past surgical history and problem list     Review of Systems     Review of Systems   Constitutional: Negative for activity change, appetite change, fatigue and fever  HENT: Negative for congestion, ear pain, sinus pressure, sinus pain and sore throat  Eyes: Negative for pain, discharge, redness and itching  Respiratory: Negative for cough, chest tightness, shortness of breath and stridor  Cardiovascular: Negative for chest pain, palpitations and leg swelling  Gastrointestinal: Negative for abdominal pain, blood in stool, constipation, diarrhea and nausea  Endocrine: Negative for heat intolerance and polydipsia  Genitourinary: Negative for dysuria, flank pain, frequency and hematuria  Musculoskeletal: Negative for back pain, joint swelling and neck pain  Skin: Negative for pallor and rash  Neurological: Negative for dizziness, tremors, weakness, numbness and headaches  Hematological: Does not bruise/bleed easily  Psychiatric/Behavioral: Negative for agitation and behavioral problems         Past Medical History     Past Medical History:   Diagnosis Date    Anxiety 9/5/2019    Obesity        Past Surgical History     Past Surgical History:   Procedure Laterality Date    ELBOW SURGERY Right        Social History     Social History     Socioeconomic History    Marital status: Single     Spouse name: None    Number of children: None    Years of education: None    Highest education level: None   Occupational History    None   Social Needs    Financial resource strain: Not hard at all   [place] insecurity     Worry: Never true     Inability: Never true    Transportation needs     Medical: No     Non-medical: No   Tobacco Use    Smoking status: Never Smoker    Smokeless tobacco: Never Used   Substance and Sexual Activity    Alcohol use: Yes     Frequency: Never     Drinks per session: 1 or 2     Binge frequency: Never     Comment: socially    Drug use: Never    Sexual activity: Yes     Partners: Male   Lifestyle    Physical activity     Days per week: 0 days     Minutes per session: 0 min    Stress: Rather much   Relationships    Social connections     Talks on phone: More than three times a week     Gets together: Once a week Attends Anabaptism service: Never     Active member of club or organization: No     Attends meetings of clubs or organizations: Never     Relationship status: Never     Intimate partner violence     Fear of current or ex partner: No     Emotionally abused: No     Physically abused: No     Forced sexual activity: No   Other Topics Concern    None   Social History Narrative    None       Family History     Family History   Problem Relation Age of Onset    Hypertension Father        Current Medications       Current Outpatient Medications:     busPIRone (BUSPAR) 10 mg tablet, , Disp: , Rfl:     clonazePAM (KlonoPIN) 0 25 MG disintegrating tablet, , Disp: , Rfl:     hydrocortisone 2 5 % cream, Apply topically 2 (two) times a day, Disp: 30 g, Rfl: 0    Vitamin D, Cholecalciferol, 50 MCG (2000 UT) CAPS, Take 2,000 Int'l Units by mouth daily, Disp: 30 capsule, Rfl: 3     Allergies     No Known Allergies    Objective     /70   Pulse 80   Temp 99 6 °F (37 6 °C) (Tympanic)   Ht 5' 5" (1 651 m)   Wt 92 5 kg (204 lb)   LMP 09/03/2020 (Approximate)   SpO2 98%   Breastfeeding No   BMI 33 95 kg/m²      Physical Exam  Vitals signs and nursing note reviewed  Constitutional:       General: She is not in acute distress  Appearance: She is well-developed  She is not diaphoretic  HENT:      Head: Normocephalic  Right Ear: Tympanic membrane, ear canal and external ear normal       Left Ear: Tympanic membrane, ear canal and external ear normal       Nose: Nose normal  No congestion or rhinorrhea  Mouth/Throat:      Mouth: Mucous membranes are moist       Pharynx: Oropharynx is clear  No oropharyngeal exudate or posterior oropharyngeal erythema  Eyes:      General:         Right eye: No discharge  Left eye: No discharge  Conjunctiva/sclera: Conjunctivae normal       Pupils: Pupils are equal, round, and reactive to light     Neck:      Musculoskeletal: Normal range of motion and neck supple  Vascular: No JVD  Cardiovascular:      Rate and Rhythm: Normal rate and regular rhythm  Heart sounds: Normal heart sounds  No murmur  No gallop  Pulmonary:      Effort: Pulmonary effort is normal  No respiratory distress  Breath sounds: Normal breath sounds  No stridor  No wheezing or rales  Chest:      Chest wall: No tenderness  Abdominal:      General: There is no distension  Palpations: Abdomen is soft  There is no mass  Tenderness: There is no abdominal tenderness  There is no rebound  Musculoskeletal:         General: No tenderness  Lymphadenopathy:      Cervical: No cervical adenopathy  Skin:     General: Skin is warm  Findings: No erythema or rash  Neurological:      General: No focal deficit present  Mental Status: She is alert and oriented to person, place, and time  Motor: No weakness        Gait: Gait normal    Psychiatric:         Mood and Affect: Mood normal          Behavior: Behavior normal          Duglas Brooks MD  [address] [place] [address]

## 2020-12-02 DIAGNOSIS — E55.9 VITAMIN D DEFICIENCY: ICD-10-CM

## 2020-12-02 RX ORDER — MULTIVIT-MIN/IRON/FOLIC ACID/K 18-600-40
2000 CAPSULE ORAL DAILY
Qty: 30 CAPSULE | Refills: 3 | Status: SHIPPED | OUTPATIENT
Start: 2020-12-02 | End: 2021-01-11 | Stop reason: SDUPTHER

## 2021-01-11 ENCOUNTER — TELEMEDICINE (OUTPATIENT)
Dept: FAMILY MEDICINE CLINIC | Facility: CLINIC | Age: 26
End: 2021-01-11
Payer: COMMERCIAL

## 2021-01-11 VITALS — SYSTOLIC BLOOD PRESSURE: 127 MMHG | HEART RATE: 80 BPM | TEMPERATURE: 97.6 F | DIASTOLIC BLOOD PRESSURE: 71 MMHG

## 2021-01-11 DIAGNOSIS — U07.1 COVID-19 VIRUS INFECTION: ICD-10-CM

## 2021-01-11 DIAGNOSIS — E55.9 VITAMIN D DEFICIENCY: ICD-10-CM

## 2021-01-11 DIAGNOSIS — F41.9 ANXIETY: Primary | ICD-10-CM

## 2021-01-11 PROCEDURE — 99214 OFFICE O/P EST MOD 30 MIN: CPT | Performed by: NURSE PRACTITIONER

## 2021-01-11 PROCEDURE — 1036F TOBACCO NON-USER: CPT | Performed by: NURSE PRACTITIONER

## 2021-01-11 RX ORDER — MULTIVIT-MIN/IRON/FOLIC ACID/K 18-600-40
2000 CAPSULE ORAL DAILY
Qty: 30 CAPSULE | Refills: 1 | Status: SHIPPED | OUTPATIENT
Start: 2021-01-11

## 2021-01-11 NOTE — PROGRESS NOTES
Virtual Regular Visit      Assessment/Plan:    Problem List Items Addressed This Visit        Other    Anxiety - Primary     Chronic symptomatic patient with worsening anxiety recently  Patient states that she ran out of her Buspar 10 mg tablet approximately 12/24/2020 so she hasn't been taking it  Patient reports that she called for an appointment with her Psych doctor, but they can't see her until the end of the month  Patient reports that for the past two days she's been having anxiety attacks with SOB and increased pulse rate  Patient denies SOB at rest and with activity beyond the two anxiety attacks, palpitations, lower extremity edema, chest pain, and weight gain  Patient reports that breathing exercises and taking a clonazepam alleviate symptoms  Recommend patient continue with medications, breathing exercises, call Psych MD and schedule a follow up appointment and ask them to refill her medications until that appointment  Patient verbalized understanding and agrees with treatment plan  Follow up with Dr Young Zamora scheduled for the end of January  Vitamin D deficiency     Chronic uncontrolled patient states that she doesn't have anymore Vit D to take  Patient reports that she doesn't understand why she is taking it, educated patient that she has a vit D deficiency  Discussed proper use, side effects, and follow up appointments and labwork  Relevant Medications    Vitamin D, Cholecalciferol, 50 MCG (2000 UT) CAPS    COVID-19 virus infection     Acute symptomatic patient tested positive through work 12/31/2020  Patient states that she continues with change to smell and taste, educated patient that those symptoms can last well past her active viral infection  Recommend patient can return to work today following protocol  Patient verbalized that she is deciding if she wants to return to that job at all  Discussed that that is a decision that she needs to make herself   Patient verbalized understanding  Reason for visit is   Chief Complaint   Patient presents with    COVID-19    Virtual Regular Visit        [facility]    Provider located at [address] [address] [place] [address] [phone]      Recent Visits  No visits were found meeting these conditions  Showing recent visits within past 7 days and meeting all other requirements     Today's Visits  Date Type Provider Dept   01/11/21 Telemedicine Valerie Fuentes, [facility] today's visits and meeting all other requirements     Future Appointments  No visits were found meeting these conditions  Showing future appointments within next 150 days and meeting all other requirements        The patient was identified by name and date of birth  Walter Light was informed that this is a telemedicine visit and that the visit is being conducted through [employer] and patient was informed that this is not a secure, HIPAA-compliant platform  She agrees to proceed     My office door was closed  No one else was in the room  She acknowledged consent and understanding of privacy and security of the video platform  The patient has agreed to participate and understands they can discontinue the visit at any time  Patient is aware this is a billable service  Subjective  Walter Light is a 26 y o  female    Patient reports having increasing anxiety for the past 2-3 days  Patient states that she ran out of her Buspar 10 mg tablet approximately 12/24/2020 so she hasn't been taking it  Patient reports that she called for an appointment with her Psych doctor, but they can't see her until the end of the month  Patient reports that for the past two days she's been having anxiety attacks with SOB and increased pulse rate at bedtime when she tries to go to bed   Patient states that when these situations occurred that she did deep breathing exercises and took a clonazepam which slowly alleviated the symptoms  No associated heart or lung PMH noted  Past Medical History:   Diagnosis Date    Anxiety 9/5/2019    Obesity        Past Surgical History:   Procedure Laterality Date    ELBOW SURGERY Right        Current Outpatient Medications   Medication Sig Dispense Refill    busPIRone (BUSPAR) 10 mg tablet       clonazePAM (KlonoPIN) 0 25 MG disintegrating tablet       hydrocortisone 2 5 % cream Apply topically 2 (two) times a day (Patient not taking: Reported on 1/11/2021) 30 g 0    Vitamin D, Cholecalciferol, 50 MCG (2000 UT) CAPS Take 2,000 Int'l Units by mouth daily 30 capsule 1     No current facility-administered medications for this visit  No Known Allergies    Review of Systems   Constitutional: Negative for activity change, appetite change, fatigue, fever and unexpected weight change  HENT: Negative for congestion, postnasal drip, rhinorrhea, sneezing and sore throat  Respiratory: Negative for cough, chest tightness and wheezing  Cardiovascular: Negative for leg swelling  Gastrointestinal: Negative for abdominal pain, constipation, diarrhea and vomiting  Musculoskeletal: Negative for arthralgias and myalgias  Skin: Negative for color change, pallor and rash  Neurological: Negative for weakness, light-headedness and headaches  Hematological: Negative for adenopathy  Psychiatric/Behavioral: Negative for agitation  Video Exam    Vitals:    01/11/21 1022   BP: 127/71   BP Location: Left arm   Patient Position: Sitting   Pulse: 80   Temp: 97 6 °F (36 4 °C)       Physical Exam  Constitutional:       General: She is not in acute distress  Appearance: Normal appearance  She is not ill-appearing, toxic-appearing or diaphoretic  HENT:      Head: Normocephalic and atraumatic  Nose: No congestion or rhinorrhea     Eyes:      General: No scleral icterus  Right eye: No discharge  Left eye: No discharge  Conjunctiva/sclera: Conjunctivae normal    Neck:      Musculoskeletal: Normal range of motion  Pulmonary:      Effort: Pulmonary effort is normal  No respiratory distress  Musculoskeletal: Normal range of motion  Skin:     Coloration: Skin is not jaundiced or pale  Findings: No bruising, erythema or rash  Neurological:      General: No focal deficit present  Mental Status: She is alert and oriented to person, place, and time  Psychiatric:         Mood and Affect: Mood normal          Behavior: Behavior normal          Thought Content: Thought content normal          Judgment: Judgment normal           I spent 15 minutes directly with the patient during this visit      VIRTUAL VISIT Jennie De La Rosa acknowledges that she has consented to an online visit or consultation  She understands that the online visit is based solely on information provided by her, and that, in the absence of a face-to-face physical evaluation by the physician, the diagnosis she receives is both limited and provisional in terms of accuracy and completeness  This is not intended to replace a full medical face-to-face evaluation by the physician  Walter Light understands and accepts these terms

## 2021-01-11 NOTE — ASSESSMENT & PLAN NOTE
Acute symptomatic patient tested positive through work 12/31/2020  Patient states that she continues with change to smell and taste, educated patient that those symptoms can last well past her active viral infection  Recommend patient can return to work today following protocol  Patient verbalized that she is deciding if she wants to return to that job at all  Discussed that that is a decision that she needs to make herself  Patient verbalized understanding

## 2021-01-11 NOTE — PATIENT INSTRUCTIONS
Anxiety   WHAT YOU NEED TO KNOW:   Anxiety is a condition that causes you to feel extremely worried or nervous  The feelings are so strong that they can cause problems with your daily activities or sleep  Anxiety may be triggered by something you fear, or it may happen without a cause  Family or work stress, smoking, caffeine, and alcohol can increase your risk for anxiety  Certain medicines or health conditions can also increase your risk  Anxiety can become a long-term condition if it is not managed or treated  DISCHARGE INSTRUCTIONS:   Call your local emergency number (911 in the [address]) if:   · You have chest pain, tightness, or heaviness that may spread to your shoulders, arms, jaw, neck, or back  · You feel like hurting yourself or someone else  Call your doctor if:   · Your symptoms get worse or do not get better with treatment  · Your anxiety keeps you from doing your regular daily activities  · You have new symptoms since your last visit  · You have questions or concerns about your condition or care  Medicines:   · Medicines  may be given to help you feel more calm and relaxed, and decrease your symptoms  · Take your medicine as directed  Contact your healthcare provider if you think your medicine is not helping or if you have side effects  Tell him of her if you are allergic to any medicine  Keep a list of the medicines, vitamins, and herbs you take  Include the amounts, and when and why you take them  Bring the list or the pill bottles to follow-up visits  Carry your medicine list with you in case of an emergency  Manage anxiety:   · Talk to someone about your anxiety  Your healthcare provider may suggest counseling  Cognitive behavioral therapy can help you understand and change how you react to events that trigger your symptoms  You might feel more comfortable talking with a friend or family member about your anxiety   Choose someone you know will be supportive and encouraging  · Find ways to relax  Activities such as exercise, meditation, or listening to music can help you relax  Spend time with friends, or do things you enjoy  · Practice deep breathing  Deep breathing can help you relax when you feel anxious  Focus on taking slow, deep breaths several times a day, or during an anxiety attack  Breathe in through your nose and out through your mouth  · Create a regular sleep routine  Regular sleep can help you feel calmer during the day  Go to sleep and wake up at the same times every day  Do not watch television or use the computer right before bed  Your room should be comfortable, dark, and quiet  · Eat a variety of healthy foods  Healthy foods include fruits, vegetables, low-fat dairy products, lean meats, fish, whole-grain breads, and cooked beans  Healthy foods can help you feel less anxious and have more energy  · Exercise regularly  Exercise can increase your energy level  Exercise may also lift your mood and help you sleep better  Your healthcare provider can help you create an exercise plan  · Do not smoke  Nicotine and other chemicals in cigarettes and cigars can increase anxiety  Ask your healthcare provider for information if you currently smoke and need help to quit  E-cigarettes or smokeless tobacco still contain nicotine  Talk to your healthcare provider before you use these products  · Do not have caffeine  Caffeine can make your symptoms worse  Do not have foods or drinks that are meant to increase your energy level  · Limit or do not drink alcohol  Ask your healthcare provider if alcohol is safe for you  You may not be able to drink alcohol if you take certain anxiety or depression medicines  Limit alcohol to 1 drink per day if you are a woman  Limit alcohol to 2 drinks per day if you are a man  A drink of alcohol is 12 ounces of beer, 5 ounces of wine, or 1½ ounces of liquor  · Do not use drugs    Drugs can make your anxiety worse  It can also make anxiety hard to manage  Talk to your healthcare provider if you use drugs and want help to quit  Follow up with your doctor within 2 weeks or as directed:  Write down your questions so you remember to ask them during your visits  © Copyright [address] Information is for End User's use only and may not be sold, redistributed or otherwise used for commercial purposes  All illustrations and images included in CareNotes® are the copyrighted property of [employer]  or [facility] Ivan Hicks   The above information is an  only  It is not intended as medical advice for individual conditions or treatments  Talk to your doctor, nurse or pharmacist before following any medical regimen to see if it is safe and effective for you

## 2021-01-11 NOTE — ASSESSMENT & PLAN NOTE
Chronic symptomatic patient with worsening anxiety recently  Patient states that she ran out of her Buspar 10 mg tablet approximately 12/24/2020 so she hasn't been taking it  Patient reports that she called for an appointment with her Psych doctor, but they can't see her until the end of the month  Patient reports that for the past two days she's been having anxiety attacks with SOB and increased pulse rate  Patient denies SOB at rest and with activity beyond the two anxiety attacks, palpitations, lower extremity edema, chest pain, and weight gain  Patient reports that breathing exercises and taking a clonazepam alleviate symptoms  Recommend patient continue with medications, breathing exercises, call Psych MD and schedule a follow up appointment and ask them to refill her medications until that appointment  Patient verbalized understanding and agrees with treatment plan  Follow up with Dr Mamie Hankins scheduled for the end of January

## 2021-01-11 NOTE — ASSESSMENT & PLAN NOTE
Chronic uncontrolled patient states that she doesn't have anymore Vit D to take  Patient reports that she doesn't understand why she is taking it, educated patient that she has a vit D deficiency  Discussed proper use, side effects, and follow up appointments and labwork

## 2021-02-03 ENCOUNTER — TELEMEDICINE (OUTPATIENT)
Dept: FAMILY MEDICINE CLINIC | Facility: CLINIC | Age: 26
End: 2021-02-03
Payer: COMMERCIAL

## 2021-02-03 VITALS
TEMPERATURE: 98.1 F | WEIGHT: 183.9 LBS | DIASTOLIC BLOOD PRESSURE: 77 MMHG | HEART RATE: 77 BPM | SYSTOLIC BLOOD PRESSURE: 121 MMHG | HEIGHT: 65 IN | BODY MASS INDEX: 30.64 KG/M2

## 2021-02-03 DIAGNOSIS — Z13.1 ENCOUNTER FOR SCREENING FOR DIABETES MELLITUS: ICD-10-CM

## 2021-02-03 DIAGNOSIS — Z13.220 ENCOUNTER FOR SCREENING FOR LIPID DISORDER: ICD-10-CM

## 2021-02-03 DIAGNOSIS — Z12.4 SCREENING FOR CERVICAL CANCER: ICD-10-CM

## 2021-02-03 DIAGNOSIS — Z13.6 SCREENING FOR HYPERTENSION: ICD-10-CM

## 2021-02-03 DIAGNOSIS — E55.9 VITAMIN D DEFICIENCY: Primary | ICD-10-CM

## 2021-02-03 DIAGNOSIS — F41.9 ANXIETY: ICD-10-CM

## 2021-02-03 DIAGNOSIS — Z11.59 SCREENING FOR VIRAL AND CHLAMYDIAL DISEASES: ICD-10-CM

## 2021-02-03 DIAGNOSIS — Z11.8 SCREENING FOR VIRAL AND CHLAMYDIAL DISEASES: ICD-10-CM

## 2021-02-03 PROCEDURE — 3725F SCREEN DEPRESSION PERFORMED: CPT | Performed by: FAMILY MEDICINE

## 2021-02-03 PROCEDURE — 99214 OFFICE O/P EST MOD 30 MIN: CPT | Performed by: FAMILY MEDICINE

## 2021-02-03 NOTE — ASSESSMENT & PLAN NOTE
A chronic asymptomatic she does follow up with the psychiatric currently on BuSpar and clonazepam tolerated well without any side effect

## 2021-02-03 NOTE — ASSESSMENT & PLAN NOTE
Chronic improved patient did lose weight compared with before she trying to watch for the portion and quality and quantity of the food encouraged patient to continue a

## 2021-02-03 NOTE — PROGRESS NOTES
Virtual Regular Visit      Assessment/Plan:    Problem List Items Addressed This Visit        Other    Anxiety      A chronic asymptomatic she does follow up with the psychiatric currently on BuSpar and clonazepam tolerated well without any side effect         BMI 30 0-30 9,adult      Chronic improved patient did lose weight compared with before she trying to watch for the portion and quality and quantity of the food encouraged patient to continue a         Vitamin D deficiency - Primary     A chronic asymptomatic patient taking vitamin-D 2000 International Units once a day proper use and vitamin-D rich diet discussed with the patient patient did not have vitamin-D level check since September 2019 we discussed the patient important compliant with the lab work will order another 1 today         Screening for cervical cancer      A refer the patient to see gyn for screening for cervical cancer         Relevant Orders    Ambulatory referral to Gynecology          BMI Counseling: Body mass index is 30 6 kg/m²  The BMI is above normal  Nutrition recommendations include decreasing portion sizes, consuming healthier snacks and limiting drinks that contain sugar  Exercise recommendations include exercising 3-5 times per week  No pharmacotherapy was ordered  Patient referred to PCP due to patient being overweight  Reason for visit is   Chief Complaint   Patient presents with    Virtual Regular Visit        Encounter provider Collette Doan MD    Provider located at [facility] AT [address] [address] [phone]      Recent Visits  No visits were found meeting these conditions     Showing recent visits within past 7 days and meeting all other requirements     Today's Visits  Date Type Provider Dept   02/03/21 Telemedicine Collette Doan MD [facility]  [facility] [place]   Showing today's visits and meeting all other requirements     Future Appointments  No visits were found meeting these conditions  Showing future appointments within next 150 days and meeting all other requirements        The patient was identified by name and date of birth  Richard Kelly was informed that this is a telemedicine visit and that the visit is being conducted through [employer] [facility] [employer] and patient was informed that this is not a secure, HIPAA-compliant platform  She agrees to proceed     My office door was closed  No one else was in the room  She acknowledged consent and understanding of privacy and security of the video platform  The patient has agreed to participate and understands they can discontinue the visit at any time  Patient is aware this is a billable service       Subjective  Richard Kelly is a 26 y o  female       Patient follow-up with a chronic condition patient since been seen last time she did have a COVID-19 at the end of December and she today he feel better no short of breath no cough no wheezing no abdomen pain nausea vomiting or diarrhea she did have some an anxiety from feeling short of breath she took clonazepam and it did help the patient history of vitamin-D deficiency on vitamin-D 2000 International Units once a day tolerated well deny any bone pain joint pain no muscle pain no fatigue and she also had the history of an anxiety for which she been seen by psychiatric and who managed her medication and no exacerbation or hospitalization patient has been trying to watch for her diet and she did lose weight compared with before the patient did not do blood work       Past Medical History:   Diagnosis Date    Anxiety 9/5/2019    Obesity        Past Surgical History:   Procedure Laterality Date    ELBOW SURGERY Right        Current Outpatient Medications   Medication Sig Dispense Refill    busPIRone (BUSPAR) 10 mg tablet Take 15 mg by mouth 4 (four) times a day       clonazePAM (KlonoPIN) 0 25 MG disintegrating tablet Take 0 5 mg by mouth 4 (four) times a day       Vitamin D, Cholecalciferol, 50 MCG (2000 UT) CAPS Take 2,000 Int'l Units by mouth daily 30 capsule 1    vitamin E 100 UNIT capsule Take 180 Units by mouth daily      hydrocortisone 2 5 % cream Apply topically 2 (two) times a day (Patient not taking: Reported on 1/11/2021) 30 g 0     No current facility-administered medications for this visit  No Known Allergies    Review of Systems   Constitutional: Negative for activity change, appetite change, fatigue and fever  HENT: Negative for congestion, ear pain, sinus pressure, sinus pain and sore throat  Eyes: Negative for pain, discharge, redness and itching  Respiratory: Negative for cough, chest tightness, shortness of breath and stridor  Cardiovascular: Negative for chest pain, palpitations and leg swelling  Gastrointestinal: Negative for abdominal pain, blood in stool, constipation, diarrhea and nausea  Genitourinary: Negative for dysuria, flank pain, frequency and hematuria  Musculoskeletal: Negative for back pain, joint swelling and neck pain  Skin: Negative for pallor and rash  Neurological: Negative for dizziness, tremors, weakness, numbness and headaches  Hematological: Does not bruise/bleed easily  Video Exam    Vitals:    02/03/21 0859   BP: 121/77   BP Location: Left arm   Patient Position: Sitting   Cuff Size: Adult   Pulse: 77   Temp: 98 1 °F (36 7 °C)   Weight: 83 4 kg (183 lb 14 4 oz)   Height: 5' 5" (1 651 m)       Physical Exam  Constitutional:       General: She is not in acute distress  Appearance: Normal appearance  She is not ill-appearing, toxic-appearing or diaphoretic  HENT:      Head: Normocephalic and atraumatic  Nose: No rhinorrhea  Mouth/Throat:      Pharynx: Oropharynx is clear  No oropharyngeal exudate or posterior oropharyngeal erythema  Eyes:      General: No scleral icterus  Right eye: No discharge  Left eye: No discharge  Conjunctiva/sclera: Conjunctivae normal    Neck:      Musculoskeletal: Normal range of motion  No neck rigidity  Pulmonary:      Effort: Pulmonary effort is normal  No respiratory distress  Abdominal:      General: Abdomen is flat  There is no distension  Musculoskeletal:         General: No signs of injury  Skin:     Findings: No erythema or rash  Neurological:      Mental Status: She is alert and oriented to person, place, and time  Gait: Gait normal    Psychiatric:         Mood and Affect: Mood normal           I spent 15 minutes directly with the patient during this visit      VIRTUAL VISIT Harish Rae acknowledges that she has consented to an online visit or consultation  She understands that the online visit is based solely on information provided by her, and that, in the absence of a face-to-face physical evaluation by the physician, the diagnosis she receives is both limited and provisional in terms of accuracy and completeness  This is not intended to replace a full medical face-to-face evaluation by the physician  Bill Lew understands and accepts these terms

## 2021-02-03 NOTE — ASSESSMENT & PLAN NOTE
A chronic asymptomatic patient taking vitamin-D 2000 International Units once a day proper use and vitamin-D rich diet discussed with the patient patient did not have vitamin-D level check since September 2019 we discussed the patient important compliant with the lab work will order another 1 today

## 2021-02-23 ENCOUNTER — TELEMEDICINE (OUTPATIENT)
Dept: FAMILY MEDICINE CLINIC | Facility: CLINIC | Age: 26
End: 2021-02-23
Payer: COMMERCIAL

## 2021-02-23 VITALS — OXYGEN SATURATION: 97 % | HEART RATE: 81 BPM | TEMPERATURE: 96.3 F

## 2021-02-23 DIAGNOSIS — F41.9 ANXIETY: Primary | ICD-10-CM

## 2021-02-23 DIAGNOSIS — R06.02 SOB (SHORTNESS OF BREATH): ICD-10-CM

## 2021-02-23 PROCEDURE — 99214 OFFICE O/P EST MOD 30 MIN: CPT | Performed by: NURSE PRACTITIONER

## 2021-02-23 PROCEDURE — 1036F TOBACCO NON-USER: CPT | Performed by: NURSE PRACTITIONER

## 2021-02-23 NOTE — ASSESSMENT & PLAN NOTE
Chronic symptomatic patient with worsening anxiety  Patient reports having increasing SOB x5 days but has been under additional stress since her and her b/f broke up  Patient reports seeing psychiatric in November and currently on Buspar and Klonopin  Patient non compliant with medications as prescribed because her insurance changed and she is trying to not use the medications all the time to not run out  Patient educated that she should schedule with Psych and she should be taking medications as ordered  Breathing exercises and techniques discussed

## 2021-02-23 NOTE — ASSESSMENT & PLAN NOTE
Acute symptomatic patient reports increasing SOB x 5 days  Patient denies chest pain, lower extremity edema, palpitation, and fever  Patient O2 sat 97%  Patient denies SOB in relation to activity  Patient with increasing stress/anxiety after breakup with b/f  Patient denied checking pulse ox when episodes occurred  Recommend patient schedule with psych, take medications as prescribed, monitor o2 sat when episodes occur, breathing exercises, and patient scheduled for in office appt  2/24/2021 to evaluate

## 2021-02-23 NOTE — PROGRESS NOTES
Virtual Regular Visit      Assessment/Plan:    Problem List Items Addressed This Visit        Other    Anxiety - Primary     Chronic symptomatic patient with worsening anxiety  Patient reports having increasing SOB x5 days but has been under additional stress since her and her b/f broke up  Patient reports seeing psychiatric in November and currently on Buspar and Klonopin  Patient non compliant with medications as prescribed because her insurance changed and she is trying to not use the medications all the time to not run out  Patient educated that she should schedule with Psych and she should be taking medications as ordered  Breathing exercises and techniques discussed  SOB (shortness of breath)     Acute symptomatic patient reports increasing SOB x 5 days  Patient denies chest pain, lower extremity edema, palpitation, and fever  Patient O2 sat 97%  Patient denies SOB in relation to activity  Patient with increasing stress/anxiety after breakup with b/f  Patient denied checking pulse ox when episodes occurred  Recommend patient schedule with psych, take medications as prescribed, monitor o2 sat when episodes occur, breathing exercises, and patient scheduled for in office appt  2/24/2021 to evaluate  Reason for visit is   Chief Complaint   Patient presents with    Shortness of Breath    Virtual Regular Visit        Encounter provider Billie Marmolejo, Mc Guzman    Provider located at [address]  [address] [facility] [address] [facility] [address] [phone]      Recent Visits  No visits were found meeting these conditions     Showing recent visits within past 7 days and meeting all other requirements     Today's Visits  Date Type Provider Dept   02/23/21 Telemedicine Valerie [address] [facility]   Showing today's visits and meeting all other requirements     Future Appointments  No visits were found meeting these conditions  Showing future appointments within next 150 days and meeting all other requirements        The patient was identified by name and date of birth  Gold Almaraz was informed that this is a telemedicine visit and that the visit is being conducted through [facility] and patient was informed that this is not a secure, HIPAA-compliant platform  She agrees to proceed     My office door was closed  No one else was in the room  She acknowledged consent and understanding of privacy and security of the video platform  The patient has agreed to participate and understands they can discontinue the visit at any time  Patient is aware this is a billable service  Subjective  Gold Almaraz is a 26 y o  female    Shortness of Breath  This is a new problem  The current episode started in the past 7 days (worsening in past 5 days)  The problem occurs every few minutes  The problem has been unchanged  The average episode lasts 10 minutes  Pertinent negatives include no abdominal pain, chest pain, coryza, ear pain, fever, headaches, leg pain, leg swelling, PND, rash, rhinorrhea, sore throat, sputum production, swollen glands, vomiting or wheezing  The symptoms are aggravated by eating and emotional upset  The patient has no known risk factors for DVT/PE  She has tried rest for the symptoms  The treatment provided mild relief  There is no history of allergies, aspirin allergies, asthma, bronchiolitis, CAD, chronic lung disease, COPD, DVT, a heart failure, PE, pneumonia or a recent surgery          Past Medical History:   Diagnosis Date    Anxiety 9/5/2019    Obesity        Past Surgical History:   Procedure Laterality Date    ELBOW SURGERY Right        Current Outpatient Medications   Medication Sig Dispense Refill    busPIRone (BUSPAR) 10 mg tablet Take 15 mg by mouth 4 (four) times a day       clonazePAM (KlonoPIN) 0 25 MG disintegrating tablet Take 0 5 mg by mouth 4 (four) times a day       Vitamin D, Cholecalciferol, 50 MCG (2000 UT) CAPS Take 2,000 Int'l Units by mouth daily 30 capsule 1    hydrocortisone 2 5 % cream Apply topically 2 (two) times a day (Patient not taking: Reported on 1/11/2021) 30 g 0    vitamin E 100 UNIT capsule Take 180 Units by mouth daily       No current facility-administered medications for this visit  No Known Allergies    Review of Systems   Constitutional: Negative for activity change, appetite change, chills, fatigue and fever  HENT: Negative for congestion, ear pain, postnasal drip, rhinorrhea, sneezing and sore throat  Respiratory: Positive for shortness of breath  Negative for cough, sputum production, chest tightness and wheezing  Cardiovascular: Negative for chest pain, palpitations, leg swelling and PND  Gastrointestinal: Negative for abdominal pain, constipation, diarrhea, nausea and vomiting  Musculoskeletal: Negative for arthralgias and myalgias  Skin: Negative for color change, pallor and rash  Neurological: Positive for light-headedness (during episodes)  Negative for dizziness, weakness and headaches  Hematological: Negative for adenopathy  Psychiatric/Behavioral: Negative for agitation and confusion  The patient is nervous/anxious (Increasing with recent breakup)  Video Exam    Vitals:    02/23/21 1408   Pulse: 81   Temp: (!) 96 3 °F (35 7 °C)   TempSrc: Tympanic   SpO2: 97%       Physical Exam  Constitutional:       General: She is not in acute distress  Appearance: Normal appearance  She is not ill-appearing, toxic-appearing or diaphoretic  HENT:      Head: Normocephalic and atraumatic  Nose: No congestion or rhinorrhea  Eyes:      General: No scleral icterus  Right eye: No discharge  Left eye: No discharge  Neck:      Musculoskeletal: Normal range of motion  Pulmonary:      Effort: Pulmonary effort is normal  No respiratory distress     Musculoskeletal: Normal range of motion  Skin:     Coloration: Skin is not jaundiced or pale  Findings: No bruising, erythema or rash  Neurological:      General: No focal deficit present  Mental Status: She is alert and oriented to person, place, and time  Psychiatric:         Mood and Affect: Mood normal          Behavior: Behavior normal          Thought Content: Thought content normal          Judgment: Judgment normal           I spent 15 minutes directly with the patient during this visit      VIRTUAL VISIT Maida Marquez acknowledges that she has consented to an online visit or consultation  She understands that the online visit is based solely on information provided by her, and that, in the absence of a face-to-face physical evaluation by the physician, the diagnosis she receives is both limited and provisional in terms of accuracy and completeness  This is not intended to replace a full medical face-to-face evaluation by the physician  Aisha Chambers understands and accepts these terms

## 2021-02-23 NOTE — PATIENT INSTRUCTIONS
Anxiety   WHAT YOU NEED TO KNOW:   Anxiety is a condition that causes you to feel extremely worried or nervous  The feelings are so strong that they can cause problems with your daily activities or sleep  Anxiety may be triggered by something you fear, or it may happen without a cause  Family or work stress, smoking, caffeine, and alcohol can increase your risk for anxiety  Certain medicines or health conditions can also increase your risk  Anxiety can become a long-term condition if it is not managed or treated  DISCHARGE INSTRUCTIONS:   Call your local emergency number (911 in the [address] [address] [facility] [address]) if:   · You have chest pain, tightness, or heaviness that may spread to your shoulders, arms, jaw, neck, or back  · You feel like hurting yourself or someone else  Call your doctor if:   · Your symptoms get worse or do not get better with treatment  · Your anxiety keeps you from doing your regular daily activities  · You have new symptoms since your last visit  · You have questions or concerns about your condition or care  Medicines:   · Medicines  may be given to help you feel more calm and relaxed, and decrease your symptoms  · Take your medicine as directed  Contact your healthcare provider if you think your medicine is not helping or if you have side effects  Tell him of her if you are allergic to any medicine  Keep a list of the medicines, vitamins, and herbs you take  Include the amounts, and when and why you take them  Bring the list or the pill bottles to follow-up visits  Carry your medicine list with you in case of an emergency  Manage anxiety:   · Talk to someone about your anxiety  Your healthcare provider may suggest counseling  Cognitive behavioral therapy can help you understand and change how you react to events that trigger your symptoms  You might feel more comfortable talking with a friend or family member about your anxiety   Choose someone you know will be supportive and encouraging  · Find ways to relax  Activities such as exercise, meditation, or listening to music can help you relax  Spend time with friends, or do things you enjoy  · Practice deep breathing  Deep breathing can help you relax when you feel anxious  Focus on taking slow, deep breaths several times a day, or during an anxiety attack  Breathe in through your nose and out through your mouth  · Create a regular sleep routine  Regular sleep can help you feel calmer during the day  Go to sleep and wake up at the same times every day  Do not watch television or use the computer right before bed  Your room should be comfortable, dark, and quiet  · Eat a variety of healthy foods  Healthy foods include fruits, vegetables, low-fat dairy products, lean meats, fish, whole-grain breads, and cooked beans  Healthy foods can help you feel less anxious and have more energy  · Exercise regularly  Exercise can increase your energy level  Exercise may also lift your mood and help you sleep better  Your healthcare provider can help you create an exercise plan  · Do not smoke  Nicotine and other chemicals in cigarettes and cigars can increase anxiety  Ask your healthcare provider for information if you currently smoke and need help to quit  E-cigarettes or smokeless tobacco still contain nicotine  Talk to your healthcare provider before you use these products  · Do not have caffeine  Caffeine can make your symptoms worse  Do not have foods or drinks that are meant to increase your energy level  · Limit or do not drink alcohol  Ask your healthcare provider if alcohol is safe for you  You may not be able to drink alcohol if you take certain anxiety or depression medicines  Limit alcohol to 1 drink per day if you are a woman  Limit alcohol to 2 drinks per day if you are a man  A drink of alcohol is 12 ounces of beer, 5 ounces of wine, or 1½ ounces of liquor  · Do not use drugs    Drugs can make your anxiety worse  It can also make anxiety hard to manage  Talk to your healthcare provider if you use drugs and want help to quit  Follow up with your doctor within 2 weeks or as directed:  Write down your questions so you remember to ask them during your visits  © Copyright [address] Information is for End User's use only and may not be sold, redistributed or otherwise used for commercial purposes  All illustrations and images included in CareNotes® are the copyrighted property of [employer]  or [facility] Ivan Hicks   The above information is an  only  It is not intended as medical advice for individual conditions or treatments  Talk to your doctor, nurse or pharmacist before following any medical regimen to see if it is safe and effective for you

## 2021-02-24 ENCOUNTER — OFFICE VISIT (OUTPATIENT)
Dept: FAMILY MEDICINE CLINIC | Facility: CLINIC | Age: 26
End: 2021-02-24

## 2021-02-24 VITALS
RESPIRATION RATE: 16 BRPM | SYSTOLIC BLOOD PRESSURE: 117 MMHG | DIASTOLIC BLOOD PRESSURE: 79 MMHG | HEIGHT: 65 IN | WEIGHT: 180 LBS | OXYGEN SATURATION: 98 % | TEMPERATURE: 100 F | HEART RATE: 73 BPM | BODY MASS INDEX: 29.99 KG/M2

## 2021-02-24 DIAGNOSIS — R06.02 SOB (SHORTNESS OF BREATH): Primary | ICD-10-CM

## 2021-02-24 DIAGNOSIS — F41.9 ANXIETY: ICD-10-CM

## 2021-02-24 PROCEDURE — 99214 OFFICE O/P EST MOD 30 MIN: CPT | Performed by: NURSE PRACTITIONER

## 2021-02-24 PROCEDURE — 3008F BODY MASS INDEX DOCD: CPT | Performed by: NURSE PRACTITIONER

## 2021-02-24 PROCEDURE — 93000 ELECTROCARDIOGRAM COMPLETE: CPT | Performed by: NURSE PRACTITIONER

## 2021-02-24 NOTE — ASSESSMENT & PLAN NOTE
Chronic asymptomatic uncontrolled patient with elevated BMI  Recommend 150 minutes of moderate aerobic exercise a week  Educated on portion control, increase fruits and vegetables, reducing fast food and carbonated beverages, and consuming healthier snacks

## 2021-02-24 NOTE — ASSESSMENT & PLAN NOTE
Chronic symptomatic uncontrolled patient noncompliant with psych medications (Buspar/Klonopin) due to loss of insurance  Patient reports last appointment with Psych in November  Worsening symptoms recently and patient reports only taking medications occasionally to save them  Patient given information through the crisis center to help patient continue medications and counseling  Recommend patient take medications as prescribed and schedule with Psych through crisis center or her Psych MD  Relaxation techniques discussed

## 2021-02-24 NOTE — ASSESSMENT & PLAN NOTE
Acute symptomatic patient with increasing SOB 3-4 times a day, episodes lasting approximately 10 minutes, for the past 6 days  Patient denies chest pain, palpitations, claudication, and mucous production  Patient 02 sat 98% at rest and with activity in office  Patient denies assessing o2 sat when episodes occur at home  EKG NSR no acute changes noted  Patient with history of covid positive 12/31/2020 through work  Chest xray ordered, CBC and CMP ordered last visit, encouraged patient to complete labwork

## 2021-02-24 NOTE — PROGRESS NOTES
Assessment/Plan:    SOB (shortness of breath)  Acute symptomatic patient with increasing SOB 3-4 times a day, episodes lasting approximately 10 minutes, for the past 6 days  Patient denies chest pain, palpitations, claudication, and mucous production  Patient 02 sat 98% at rest and with activity in office  Patient denies assessing o2 sat when episodes occur at home  EKG NSR no acute changes noted  Patient with history of covid positive 12/31/2020 through work  Chest xray ordered, CBC and CMP ordered last visit, encouraged patient to complete labwork  BMI 29 0-29 9,adult  Chronic asymptomatic uncontrolled patient with elevated BMI  Recommend 150 minutes of moderate aerobic exercise a week  Educated on portion control, increase fruits and vegetables, reducing fast food and carbonated beverages, and consuming healthier snacks  Anxiety  Chronic symptomatic uncontrolled patient noncompliant with psych medications (Buspar/Klonopin) due to loss of insurance  Patient reports last appointment with Psych in November  Worsening symptoms recently and patient reports only taking medications occasionally to save them  Patient given information through the crisis center to help patient continue medications and counseling  Recommend patient take medications as prescribed and schedule with Psych through crisis center or her Psych MD  Relaxation techniques discussed  Diagnoses and all orders for this visit:    SOB (shortness of breath)  -     XR chest pa & lateral; Future  -     POCT ECG    Anxiety    BMI 29 0-29 9,adult          Subjective:      Patient ID: Rhea Morales is a 26 y o  female  Shortness of Breath  This is a new problem  The current episode started more than 1 month ago (worsening in the past week)  The problem occurs every few minutes  The problem has been gradually worsening  The average episode lasts 10 minutes  Associated symptoms include headaches   Pertinent negatives include no abdominal pain, chest pain, claudication, ear pain, fever, hemoptysis, leg pain, leg swelling, neck pain, orthopnea, PND, rash, sore throat, sputum production, swollen glands, syncope, vomiting or wheezing  The symptoms are aggravated by emotional upset (anxiety/panic attacks/stress)  The patient has no known risk factors for DVT/PE  She has tried rest (breathing exercises) for the symptoms  The treatment provided mild relief  There is no history of allergies, aspirin allergies, asthma, bronchiolitis, CAD, chronic lung disease, COPD, DVT, a heart failure, PE, pneumonia or a recent surgery  Anxiety  Presents for follow-up visit  Symptoms include excessive worry, nervous/anxious behavior, panic and shortness of breath  Patient reports no chest pain, confusion, dizziness, feeling of choking, insomnia, nausea, palpitations or suicidal ideas  Symptoms occur most days  The most recent episode lasted 10 minutes  The severity of symptoms is moderate  The quality of sleep is good  Nighttime awakenings: none  There is no history of asthma, CAD or chronic lung disease  Compliance with medications is 0-25%  The following portions of the patient's history were reviewed and updated as appropriate: allergies, current medications, past family history, past medical history, past social history, past surgical history and problem list     Review of Systems   Constitutional: Positive for appetite change (Feeling full sooner) and fatigue  Negative for activity change and fever  HENT: Negative for congestion, ear pain, postnasal drip, sinus pressure, sinus pain and sore throat  Respiratory: Positive for chest tightness and shortness of breath  Negative for cough, hemoptysis, sputum production and wheezing  Cardiovascular: Negative for chest pain, palpitations, orthopnea, claudication, leg swelling, syncope and PND  Gastrointestinal: Negative for abdominal pain, blood in stool, constipation, diarrhea, nausea and vomiting  Genitourinary: Negative for dysuria  Musculoskeletal: Negative for neck pain  Skin: Negative for rash  Neurological: Positive for light-headedness and headaches  Negative for dizziness, facial asymmetry and numbness  Hematological: Negative for adenopathy  Psychiatric/Behavioral: Negative for agitation, behavioral problems, confusion, sleep disturbance and suicidal ideas  The patient is nervous/anxious  The patient does not have insomnia  Objective:      /79 (BP Location: Left arm, Patient Position: Sitting, Cuff Size: Large)   Pulse 73   Temp 100 °F (37 8 °C) (Tympanic)   Resp 16   Ht 5' 5" (1 651 m)   Wt 81 6 kg (180 lb)   SpO2 98%   BMI 29 95 kg/m²          Physical Exam  Constitutional:       General: She is not in acute distress  Appearance: Normal appearance  She is not ill-appearing, toxic-appearing or diaphoretic  HENT:      Head: Normocephalic and atraumatic  Right Ear: Tympanic membrane, ear canal and external ear normal  There is no impacted cerumen  Left Ear: Tympanic membrane, ear canal and external ear normal  There is no impacted cerumen  Nose: No congestion or rhinorrhea  Mouth/Throat:      Mouth: Mucous membranes are moist       Pharynx: Oropharynx is clear  No pharyngeal swelling, oropharyngeal exudate or posterior oropharyngeal erythema  Eyes:      General: No scleral icterus  Right eye: No discharge  Left eye: No discharge  Neck:      Musculoskeletal: Normal range of motion  Thyroid: No thyromegaly  Vascular: No JVD  Cardiovascular:      Rate and Rhythm: Normal rate and regular rhythm  Pulses: Normal pulses  Heart sounds: Normal heart sounds  Pulmonary:      Effort: Pulmonary effort is normal  No respiratory distress  Breath sounds: Normal breath sounds  No stridor  No wheezing, rhonchi or rales  Chest:      Chest wall: No mass, tenderness or edema     Abdominal:      General: Abdomen is flat       Palpations: Abdomen is soft  There is no hepatomegaly, splenomegaly or mass  Tenderness: There is no abdominal tenderness  There is no guarding  Musculoskeletal: Normal range of motion  Right lower leg: No edema  Left lower leg: No edema  Lymphadenopathy:      Cervical: No cervical adenopathy  Skin:     Capillary Refill: Capillary refill takes less than 2 seconds  Coloration: Skin is not cyanotic, jaundiced or pale  Findings: No bruising, erythema or rash  Neurological:      General: No focal deficit present  Mental Status: She is alert and oriented to person, place, and time  Gait: Gait normal    Psychiatric:         Mood and Affect: Mood is anxious  Behavior: Behavior normal          Thought Content:  Thought content normal          Judgment: Judgment normal

## 2021-04-05 ENCOUNTER — OFFICE VISIT (OUTPATIENT)
Dept: INTERNAL MEDICINE CLINIC | Facility: CLINIC | Age: 26
End: 2021-04-05

## 2021-04-05 VITALS
DIASTOLIC BLOOD PRESSURE: 67 MMHG | BODY MASS INDEX: 30.32 KG/M2 | SYSTOLIC BLOOD PRESSURE: 116 MMHG | WEIGHT: 182 LBS | HEIGHT: 65 IN | HEART RATE: 72 BPM | TEMPERATURE: 98.8 F

## 2021-04-05 DIAGNOSIS — Z11.1 SCREENING-PULMONARY TB: ICD-10-CM

## 2021-04-05 DIAGNOSIS — Z00.00 WELL ADULT HEALTH CHECK: Primary | ICD-10-CM

## 2021-04-05 PROCEDURE — 99499 UNLISTED E&M SERVICE: CPT | Performed by: INTERNAL MEDICINE

## 2021-04-05 PROCEDURE — 86580 TB INTRADERMAL TEST: CPT | Performed by: INTERNAL MEDICINE

## 2021-04-05 NOTE — PROGRESS NOTES
Assessment/Plan: This is a well exam for a person directed support  PPD was placed on the left forearm  She was ordered to have a QuantiFERON test by her PCP last September but did not get it  1  Well adult health check           1  Well adult health check          Subjective:      Patient ID: Khadra Starr is a 26 y o  female  This is a well exam for a person directed support  The following portions of the patient's history were reviewed and updated as appropriate: She  has a past medical history of Anxiety (9/5/2019) and Obesity  She   Patient Active Problem List    Diagnosis Date Noted    BMI 29 0-29 9,adult 02/24/2021    SOB (shortness of breath) 02/23/2021    Screening for cervical cancer 02/03/2021    COVID-19 virus infection 01/11/2021    Vitamin D deficiency 06/12/2020    Pityriasis rosea 09/25/2019    Anxiety 09/05/2019    Encounter for well adult exam with abnormal findings 09/05/2019     She  has a past surgical history that includes Elbow surgery (Right)  Her family history includes Hypertension in her father  She  reports that she has never smoked  She has never used smokeless tobacco  She reports current alcohol use  She reports that she does not use drugs  Current Outpatient Medications   Medication Sig Dispense Refill    busPIRone (BUSPAR) 10 mg tablet Take 15 mg by mouth 4 (four) times a day       clonazePAM (KlonoPIN) 0 25 MG disintegrating tablet Take 0 5 mg by mouth 4 (four) times a day       Vitamin D, Cholecalciferol, 50 MCG (2000 UT) CAPS Take 2,000 Int'l Units by mouth daily 30 capsule 1     No current facility-administered medications for this visit        Current Outpatient Medications on File Prior to Visit   Medication Sig    busPIRone (BUSPAR) 10 mg tablet Take 15 mg by mouth 4 (four) times a day     clonazePAM (KlonoPIN) 0 25 MG disintegrating tablet Take 0 5 mg by mouth 4 (four) times a day     Vitamin D, Cholecalciferol, 50 MCG (2000 UT) CAPS Take 2,000 Int'l Units by mouth daily     No current facility-administered medications on file prior to visit  She has No Known Allergies       Review of Systems   Constitutional: Negative for appetite change, chills, fatigue and fever  HENT: Negative for sore throat and trouble swallowing  Eyes: Negative for redness  Respiratory: Negative for shortness of breath  Cardiovascular: Negative for chest pain and palpitations  Gastrointestinal: Negative for abdominal pain, constipation and diarrhea  Genitourinary: Negative for dysuria and hematuria  Musculoskeletal: Negative for back pain and neck pain  Skin: Negative for rash  Neurological: Negative for seizures, weakness and headaches  Hematological: Negative for adenopathy  Psychiatric/Behavioral: Negative for confusion  The patient is not nervous/anxious  Objective:      /67   Pulse 72   Temp 98 8 °F (37 1 °C)   Ht 5' 5" (1 651 m)   Wt 82 6 kg (182 lb)   BMI 30 29 kg/m²     No results found for this or any previous visit (from the past 1344 hour(s))  Physical Exam  Constitutional:       General: She is not in acute distress  Appearance: Normal appearance  HENT:      Head: Normocephalic and atraumatic  Nose: Nose normal       Mouth/Throat:      Mouth: Mucous membranes are moist    Eyes:      Extraocular Movements: Extraocular movements intact  Pupils: Pupils are equal, round, and reactive to light  Cardiovascular:      Rate and Rhythm: Normal rate and regular rhythm  Pulses: Normal pulses  Heart sounds: Normal heart sounds  No murmur  No friction rub  Pulmonary:      Effort: Pulmonary effort is normal  No respiratory distress  Breath sounds: Normal breath sounds  No wheezing  Abdominal:      General: Abdomen is flat  Bowel sounds are normal  There is no distension  Palpations: Abdomen is soft  There is no mass  Tenderness: There is no abdominal tenderness   There is no guarding  Musculoskeletal: Normal range of motion  Neurological:      General: No focal deficit present  Mental Status: She is alert and oriented to person, place, and time  Mental status is at baseline  Cranial Nerves: No cranial nerve deficit     Psychiatric:         Mood and Affect: Mood normal          Behavior: Behavior normal

## 2021-04-07 ENCOUNTER — CLINICAL SUPPORT (OUTPATIENT)
Dept: INTERNAL MEDICINE CLINIC | Facility: CLINIC | Age: 26
End: 2021-04-07

## 2021-04-07 DIAGNOSIS — Z11.1 SCREENING-PULMONARY TB: Primary | ICD-10-CM

## 2021-04-08 LAB
INDURATION: 0 MM
TB SKIN TEST: NEGATIVE

## 2021-05-18 ENCOUNTER — OFFICE VISIT (OUTPATIENT)
Dept: FAMILY MEDICINE CLINIC | Facility: CLINIC | Age: 26
End: 2021-05-18

## 2021-05-18 VITALS
TEMPERATURE: 98.9 F | RESPIRATION RATE: 16 BRPM | OXYGEN SATURATION: 97 % | SYSTOLIC BLOOD PRESSURE: 118 MMHG | HEIGHT: 65 IN | BODY MASS INDEX: 29.99 KG/M2 | DIASTOLIC BLOOD PRESSURE: 78 MMHG | WEIGHT: 180 LBS | HEART RATE: 78 BPM

## 2021-05-18 DIAGNOSIS — Z11.1 TUBERCULOSIS SCREENING: ICD-10-CM

## 2021-05-18 DIAGNOSIS — Z02.89 ENCOUNTER FOR PHYSICAL EXAMINATION RELATED TO EMPLOYMENT: Primary | ICD-10-CM

## 2021-05-18 PROCEDURE — 99395 PREV VISIT EST AGE 18-39: CPT | Performed by: FAMILY MEDICINE

## 2021-05-18 NOTE — PROGRESS NOTES
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 and patient had a form indicate she did have  TB test 1st step was in December and she had 2nd step in April post of them is negative there is no record in the chart for the reading but patient reported patient deny any cough wheezing no hematosis no dyspnea on exertion      The following portions of the patient's history were reviewed and updated as appropriate: allergies, current medications, past family history, past medical history, past social history, past surgical history and problem list     Review of Systems   Constitutional: Negative for activity change, appetite change, fatigue and fever  HENT: Negative for congestion, ear pain, sinus pressure, sinus pain and sore throat  Eyes: Negative for pain, discharge, redness and itching  Respiratory: Negative for cough, chest tightness, shortness of breath and stridor  Cardiovascular: Negative for chest pain, palpitations and leg swelling  Gastrointestinal: Negative for abdominal pain, blood in stool, constipation, diarrhea and nausea  Genitourinary: Negative for dysuria, flank pain, frequency and hematuria  Musculoskeletal: Negative for back pain, joint swelling and neck pain  Skin: Negative for pallor and rash  Neurological: Negative for dizziness, tremors, weakness, numbness and headaches  Hematological: Does not bruise/bleed easily               Objective:  Vitals:    05/18/21 0847 BP: 118/78   BP Location: Left arm   Patient Position: Sitting   Cuff Size: Large   Pulse: 78   Resp: 16   Temp: 98 9 °F (37 2 °C)   TempSrc: Tympanic   SpO2: 97%   Weight: 81 6 kg (180 lb)   Height: 5' 5" (1 651 m)      Physical Exam  Vitals signs and nursing note reviewed  Constitutional:       General: She is not in acute distress  Appearance: She is well-developed  She is not diaphoretic  HENT:      Head: Normocephalic  Right Ear: Tympanic membrane, ear canal and external ear normal       Left Ear: Tympanic membrane, ear canal and external ear normal       Nose: Nose normal  No congestion or rhinorrhea  Mouth/Throat:      Mouth: Mucous membranes are moist       Pharynx: Oropharynx is clear  No oropharyngeal exudate or posterior oropharyngeal erythema  Eyes:      General:         Right eye: No discharge  Left eye: No discharge  Conjunctiva/sclera: Conjunctivae normal       Pupils: Pupils are equal, round, and reactive to light  Neck:      Musculoskeletal: Normal range of motion and neck supple  Vascular: No JVD  Cardiovascular:      Rate and Rhythm: Normal rate and regular rhythm  Heart sounds: Normal heart sounds  No murmur  No gallop  Pulmonary:      Effort: Pulmonary effort is normal  No respiratory distress  Breath sounds: Normal breath sounds  No stridor  No wheezing or rales  Chest:      Chest wall: No tenderness  Abdominal:      General: There is no distension  Palpations: Abdomen is soft  There is no mass  Tenderness: There is no abdominal tenderness  There is no rebound  Musculoskeletal:         General: No tenderness  Lymphadenopathy:      Cervical: No cervical adenopathy  Skin:     General: Skin is warm  Findings: No erythema or rash  Neurological:      Mental Status: She is alert and oriented to person, place, and time  Motor: No weakness        Gait: Gait normal            Assessment/Plan:    Encounter for physical examination related to employment  The patient her imply might require screening for communicable disease including TB and per patient she had TB test the the 2 steps 1 done in December 7 and the 2nd 1 done in April 5th the both of them done different facility and this is reported by patient no record in the chart recommend patient to do QuantiFERON test the hold on sign in the form and till we get the result the patient aware    BMI 29 0-29 9,adult   A chronic stable patient did lose 2 lb from previous visit encouraged patient to watch for the portion low carb low-fat diet increase physical activity       Diagnoses and all orders for this visit:    Encounter for physical examination related to employment    BMI 29 0-29 9,adult    Tuberculosis screening  -     Quantiferon TB Gold Plus;  Future

## 2021-05-20 NOTE — ASSESSMENT & PLAN NOTE
A chronic stable patient did lose 2 lb from previous visit encouraged patient to watch for the portion low carb low-fat diet increase physical activity

## 2021-05-20 NOTE — ASSESSMENT & PLAN NOTE
The patient her imply might require screening for communicable disease including TB and per patient she had TB test the the 2 steps 1 done in December 7 and the 2nd 1 done in April 5th the both of them done different facility and this is reported by patient no record in the chart recommend patient to do QuantiFERON test the hold on sign in the form and till we get the result the patient aware

## 2021-05-28 ENCOUNTER — APPOINTMENT (OUTPATIENT)
Dept: URGENT CARE | Age: 26
End: 2021-05-28

## 2021-05-28 ENCOUNTER — TRANSCRIBE ORDERS (OUTPATIENT)
Dept: ADMINISTRATIVE | Age: 26
End: 2021-05-28

## 2021-05-28 DIAGNOSIS — Z00.00 ROUTINE GENERAL MEDICAL EXAMINATION AT A HEALTH CARE FACILITY: Primary | ICD-10-CM

## 2021-05-28 DIAGNOSIS — Z00.00 ROUTINE GENERAL MEDICAL EXAMINATION AT A HEALTH CARE FACILITY: ICD-10-CM

## 2021-05-28 DIAGNOSIS — Z11.1 TUBERCULOSIS SCREENING: ICD-10-CM

## 2021-05-28 PROCEDURE — 86735 MUMPS ANTIBODY: CPT

## 2021-05-28 PROCEDURE — 86765 RUBEOLA ANTIBODY: CPT

## 2021-05-28 PROCEDURE — 86762 RUBELLA ANTIBODY: CPT

## 2021-05-28 PROCEDURE — 86480 TB TEST CELL IMMUN MEASURE: CPT | Performed by: FAMILY MEDICINE

## 2021-05-29 LAB — RUBV IGG SERPL IA-ACNC: 152.5 IU/ML

## 2021-06-01 LAB
MEV IGG SER QL: NORMAL
MUV IGG SER QL: NORMAL

## 2021-06-02 LAB
GAMMA INTERFERON BACKGROUND BLD IA-ACNC: 0.04 IU/ML
M TB IFN-G BLD-IMP: NEGATIVE
M TB IFN-G CD4+ BCKGRND COR BLD-ACNC: 0 IU/ML
M TB IFN-G CD4+ BCKGRND COR BLD-ACNC: 0.03 IU/ML
MITOGEN IGNF BCKGRD COR BLD-ACNC: >10 IU/ML

## 2021-09-14 ENCOUNTER — OFFICE VISIT (OUTPATIENT)
Dept: FAMILY MEDICINE CLINIC | Facility: CLINIC | Age: 26
End: 2021-09-14
Payer: COMMERCIAL

## 2021-09-14 ENCOUNTER — APPOINTMENT (OUTPATIENT)
Dept: LAB | Facility: CLINIC | Age: 26
End: 2021-09-14
Payer: COMMERCIAL

## 2021-09-14 VITALS
BODY MASS INDEX: 29.32 KG/M2 | OXYGEN SATURATION: 100 % | TEMPERATURE: 98.7 F | DIASTOLIC BLOOD PRESSURE: 70 MMHG | HEART RATE: 58 BPM | SYSTOLIC BLOOD PRESSURE: 120 MMHG | WEIGHT: 176 LBS | HEIGHT: 65 IN

## 2021-09-14 DIAGNOSIS — Z13.220 ENCOUNTER FOR SCREENING FOR LIPID DISORDER: ICD-10-CM

## 2021-09-14 DIAGNOSIS — E55.9 VITAMIN D DEFICIENCY: ICD-10-CM

## 2021-09-14 DIAGNOSIS — Z00.00 LABORATORY EXAM ORDERED AS PART OF ROUTINE GENERAL MEDICAL EXAMINATION: ICD-10-CM

## 2021-09-14 DIAGNOSIS — Z13.1 ENCOUNTER FOR SCREENING FOR DIABETES MELLITUS: ICD-10-CM

## 2021-09-14 DIAGNOSIS — Z01.84 IMMUNITY STATUS TESTING: ICD-10-CM

## 2021-09-14 DIAGNOSIS — Z13.6 SCREENING FOR HYPERTENSION: ICD-10-CM

## 2021-09-14 DIAGNOSIS — Z02.89 ENCOUNTER FOR PHYSICAL EXAMINATION RELATED TO EMPLOYMENT: ICD-10-CM

## 2021-09-14 DIAGNOSIS — Z02.89 ENCOUNTER FOR PHYSICAL EXAMINATION RELATED TO EMPLOYMENT: Primary | ICD-10-CM

## 2021-09-14 PROBLEM — U07.1 COVID-19 VIRUS INFECTION: Status: RESOLVED | Noted: 2021-01-11 | Resolved: 2021-09-14

## 2021-09-14 PROBLEM — R06.02 SOB (SHORTNESS OF BREATH): Status: RESOLVED | Noted: 2021-02-23 | Resolved: 2021-09-14

## 2021-09-14 LAB
25(OH)D3 SERPL-MCNC: 30.4 NG/ML (ref 30–100)
ANION GAP SERPL CALCULATED.3IONS-SCNC: 3 MMOL/L (ref 4–13)
BASOPHILS # BLD AUTO: 0.02 THOUSANDS/ΜL (ref 0–0.1)
BASOPHILS NFR BLD AUTO: 0 % (ref 0–1)
BUN SERPL-MCNC: 17 MG/DL (ref 5–25)
CALCIUM SERPL-MCNC: 9 MG/DL (ref 8.3–10.1)
CHLORIDE SERPL-SCNC: 108 MMOL/L (ref 100–108)
CHOLEST SERPL-MCNC: 135 MG/DL (ref 50–200)
CO2 SERPL-SCNC: 25 MMOL/L (ref 21–32)
CREAT SERPL-MCNC: 0.85 MG/DL (ref 0.6–1.3)
EOSINOPHIL # BLD AUTO: 0.06 THOUSAND/ΜL (ref 0–0.61)
EOSINOPHIL NFR BLD AUTO: 1 % (ref 0–6)
ERYTHROCYTE [DISTWIDTH] IN BLOOD BY AUTOMATED COUNT: 12.5 % (ref 11.6–15.1)
GFR SERPL CREATININE-BSD FRML MDRD: 110 ML/MIN/1.73SQ M
GLUCOSE P FAST SERPL-MCNC: 62 MG/DL (ref 65–99)
HBV SURFACE AB SER-ACNC: 30.6 MIU/ML
HBV SURFACE AG SER QL: NORMAL
HCT VFR BLD AUTO: 38.3 % (ref 34.8–46.1)
HDLC SERPL-MCNC: 76 MG/DL
HGB BLD-MCNC: 12.7 G/DL (ref 11.5–15.4)
IMM GRANULOCYTES # BLD AUTO: 0.01 THOUSAND/UL (ref 0–0.2)
IMM GRANULOCYTES NFR BLD AUTO: 0 % (ref 0–2)
LDLC SERPL CALC-MCNC: 54 MG/DL (ref 0–100)
LYMPHOCYTES # BLD AUTO: 2.54 THOUSANDS/ΜL (ref 0.6–4.47)
LYMPHOCYTES NFR BLD AUTO: 43 % (ref 14–44)
MCH RBC QN AUTO: 31.4 PG (ref 26.8–34.3)
MCHC RBC AUTO-ENTMCNC: 33.2 G/DL (ref 31.4–37.4)
MCV RBC AUTO: 95 FL (ref 82–98)
MONOCYTES # BLD AUTO: 0.37 THOUSAND/ΜL (ref 0.17–1.22)
MONOCYTES NFR BLD AUTO: 6 % (ref 4–12)
NEUTROPHILS # BLD AUTO: 2.9 THOUSANDS/ΜL (ref 1.85–7.62)
NEUTS SEG NFR BLD AUTO: 50 % (ref 43–75)
NRBC BLD AUTO-RTO: 0 /100 WBCS
PLATELET # BLD AUTO: 233 THOUSANDS/UL (ref 149–390)
PMV BLD AUTO: 10.6 FL (ref 8.9–12.7)
POTASSIUM SERPL-SCNC: 3.6 MMOL/L (ref 3.5–5.3)
RBC # BLD AUTO: 4.04 MILLION/UL (ref 3.81–5.12)
SODIUM SERPL-SCNC: 136 MMOL/L (ref 136–145)
TRIGL SERPL-MCNC: 25 MG/DL
WBC # BLD AUTO: 5.9 THOUSAND/UL (ref 4.31–10.16)

## 2021-09-14 PROCEDURE — 85025 COMPLETE CBC W/AUTO DIFF WBC: CPT

## 2021-09-14 PROCEDURE — 99214 OFFICE O/P EST MOD 30 MIN: CPT | Performed by: FAMILY MEDICINE

## 2021-09-14 PROCEDURE — 86706 HEP B SURFACE ANTIBODY: CPT

## 2021-09-14 PROCEDURE — 3725F SCREEN DEPRESSION PERFORMED: CPT | Performed by: FAMILY MEDICINE

## 2021-09-14 PROCEDURE — 3008F BODY MASS INDEX DOCD: CPT | Performed by: FAMILY MEDICINE

## 2021-09-14 PROCEDURE — 86787 VARICELLA-ZOSTER ANTIBODY: CPT

## 2021-09-14 PROCEDURE — 80061 LIPID PANEL: CPT

## 2021-09-14 PROCEDURE — 36415 COLL VENOUS BLD VENIPUNCTURE: CPT

## 2021-09-14 PROCEDURE — 1036F TOBACCO NON-USER: CPT | Performed by: FAMILY MEDICINE

## 2021-09-14 PROCEDURE — 87340 HEPATITIS B SURFACE AG IA: CPT

## 2021-09-14 PROCEDURE — 82306 VITAMIN D 25 HYDROXY: CPT

## 2021-09-14 PROCEDURE — 80048 BASIC METABOLIC PNL TOTAL CA: CPT

## 2021-09-14 RX ORDER — CLONAZEPAM 1 MG/1
TABLET ORAL
COMMUNITY
Start: 2021-09-13

## 2021-09-14 RX ORDER — ALPRAZOLAM 0.25 MG/1
TABLET ORAL
COMMUNITY
Start: 2021-09-13 | End: 2022-01-11

## 2021-09-14 RX ORDER — BUSPIRONE HYDROCHLORIDE 15 MG/1
TABLET ORAL
COMMUNITY
Start: 2021-08-09 | End: 2022-01-11

## 2021-09-14 NOTE — ASSESSMENT & PLAN NOTE
I review with patient her immunization record ,no Varicella vaccine in chart also her Hep B series not complete   recommend to check Hep B surface AB titer and Varicella titer  Per patient she will get COVID Vaccine this week

## 2021-09-14 NOTE — PROGRESS NOTES
Subjective:   Chief Complaint   Patient presents with    Follow-up     chronic conditions        Patient ID: Jonatan Martinez is a 23 y o  female  Patient is here for pre employment physical exam  The [place] to her immunization sheet patient have no varicella vaccine and she just had 1 hepatitis B the recently had the blood work check on the MR title and check for [address] which she was a immune and negative for TB screening patient deny any cough wheezing no hematosis no chest pain no short of breath no abdomen pain nausea vomiting or diarrhea      The following portions of the patient's history were reviewed and updated as appropriate: allergies, current medications, past family history, past medical history, past social history, past surgical history and problem list     Review of Systems   Constitutional: Negative for activity change, appetite change, fatigue and fever  HENT: Negative for congestion, ear pain, sinus pressure, sinus pain and sore throat  Eyes: Negative for pain, discharge, redness and itching  Respiratory: Negative for cough, chest tightness, shortness of breath and stridor  Cardiovascular: Negative for chest pain, palpitations and leg swelling  Gastrointestinal: Negative for abdominal pain, blood in stool, constipation, diarrhea and nausea  Genitourinary: Negative for dysuria, flank pain, frequency and hematuria  Musculoskeletal: Negative for back pain, joint swelling and neck pain  Skin: Negative for pallor and rash  Neurological: Negative for dizziness, tremors, weakness, numbness and headaches  Hematological: Does not bruise/bleed easily  Objective:  Vitals:    09/14/21 0849   BP: 120/70   Pulse: 58   Temp: 98 7 °F (37 1 °C)   TempSrc: Tympanic   SpO2: 100%   Weight: 79 8 kg (176 lb)   Height: 5' 4 75" (1 645 m)      Physical Exam  Vitals and nursing note reviewed  Constitutional:       General: She is not in acute distress       Appearance: She is well-developed  She is not diaphoretic  HENT:      Head: Normocephalic  Right Ear: Tympanic membrane, ear canal and external ear normal       Left Ear: Tympanic membrane, ear canal and external ear normal       Nose: Nose normal  No congestion or rhinorrhea  Mouth/Throat:      Mouth: Mucous membranes are moist       Pharynx: Oropharynx is clear  No oropharyngeal exudate or posterior oropharyngeal erythema  Eyes:      General:         Right eye: No discharge  Left eye: No discharge  Conjunctiva/sclera: Conjunctivae normal       Pupils: Pupils are equal, round, and reactive to light  Neck:      Vascular: No JVD  Cardiovascular:      Rate and Rhythm: Normal rate and regular rhythm  Heart sounds: Normal heart sounds  No murmur heard  No gallop  Pulmonary:      Effort: Pulmonary effort is normal  No respiratory distress  Breath sounds: Normal breath sounds  No stridor  No wheezing or rales  Chest:      Chest wall: No tenderness  Abdominal:      General: There is no distension  Palpations: Abdomen is soft  There is no mass  Tenderness: There is no abdominal tenderness  There is no rebound  Musculoskeletal:         General: No tenderness  Cervical back: Normal range of motion and neck supple  Lymphadenopathy:      Cervical: No cervical adenopathy  Skin:     General: Skin is warm  Findings: No erythema or rash  Neurological:      Mental Status: She is alert and oriented to person, place, and time  Motor: No weakness        Gait: Gait normal            Assessment/Plan:    Encounter for physical examination related to employment  I review with patient her immunization record ,no Varicella vaccine in chart also her Hep B series not complete   recommend to check Hep B surface AB titer and Varicella titer  Per patient she will get COVID Vaccine this week         Diagnoses and all orders for this visit:    Encounter for physical examination related to employment  -     CBC and differential; Future  -     Basic metabolic panel; Future  -     Lipid Panel with Direct LDL reflex; Future  -     Vitamin D 25 hydroxy; Future    Vitamin D deficiency  -     CBC and differential; Future  -     Basic metabolic panel; Future  -     Lipid Panel with Direct LDL reflex; Future  -     Vitamin D 25 hydroxy; Future    Immunity status testing  -     Hepatitis B surface antibody; Future  -     Hepatitis B surface antigen; Future  -     Varicella zoster antibody, IgG; Future    Laboratory exam ordered as part of routine general medical examination  -     CBC and differential; Future  -     Basic metabolic panel; Future  -     Lipid Panel with Direct LDL reflex; Future  -     Vitamin D 25 hydroxy; Future    Screening for hypertension  -     CBC and differential; Future    Encounter for screening for diabetes mellitus  -     Basic metabolic panel; Future    Encounter for screening for lipid disorder  -     Lipid Panel with Direct LDL reflex;  Future    Other orders  -     clonazePAM (KlonoPIN) 1 mg tablet; take 1 tablet by mouth AT NIGHT FOR 30 DAYS  -     busPIRone (BUSPAR) 15 mg tablet; take 1 tablet by mouth four times a day for 30 DAYS  -     ALPRAZolam (XANAX) 0 25 mg tablet; take 1 tablet by mouth if needed for anxiety for 30 DAYS--MAY USE ONCE DAILY

## 2021-09-16 LAB — VZV IGG SER IA-ACNC: ABNORMAL

## 2021-09-17 ENCOUNTER — TELEPHONE (OUTPATIENT)
Dept: FAMILY MEDICINE CLINIC | Facility: CLINIC | Age: 26
End: 2021-09-17

## 2021-09-17 NOTE — TELEPHONE ENCOUNTER
Patient not immune for Varicella   Nee to have vaccine ,but when call patient to ask when she got planing to get her COVID vaccine

## 2021-09-30 ENCOUNTER — ANNUAL EXAM (OUTPATIENT)
Dept: FAMILY MEDICINE CLINIC | Facility: CLINIC | Age: 26
End: 2021-09-30
Payer: COMMERCIAL

## 2021-09-30 VITALS
DIASTOLIC BLOOD PRESSURE: 70 MMHG | HEART RATE: 84 BPM | BODY MASS INDEX: 29.58 KG/M2 | TEMPERATURE: 98 F | WEIGHT: 176.4 LBS | OXYGEN SATURATION: 98 % | SYSTOLIC BLOOD PRESSURE: 110 MMHG

## 2021-09-30 DIAGNOSIS — Z11.3 SCREENING FOR STD (SEXUALLY TRANSMITTED DISEASE): ICD-10-CM

## 2021-09-30 DIAGNOSIS — Z23 NEED FOR VARICELLA VACCINE: ICD-10-CM

## 2021-09-30 DIAGNOSIS — B37.9 YEAST INFECTION: ICD-10-CM

## 2021-09-30 DIAGNOSIS — Z01.419 ROUTINE GYNECOLOGICAL EXAMINATION: Primary | ICD-10-CM

## 2021-09-30 DIAGNOSIS — Z11.8 SCREENING FOR CHLAMYDIAL DISEASE: ICD-10-CM

## 2021-09-30 DIAGNOSIS — N89.8 VAGINAL DISCHARGE: ICD-10-CM

## 2021-09-30 LAB
BV WHIFF TEST VAG QL: ABNORMAL
CLUE CELLS SPEC QL WET PREP: ABNORMAL
PH SMN: ABNORMAL [PH]
SL AMB POCT WET MOUNT: ABNORMAL
T VAGINALIS VAG QL WET PREP: ABNORMAL
YEAST VAG QL WET PREP: POSITIVE

## 2021-09-30 PROCEDURE — 90716 VAR VACCINE LIVE SUBQ: CPT

## 2021-09-30 PROCEDURE — 99395 PREV VISIT EST AGE 18-39: CPT | Performed by: NURSE PRACTITIONER

## 2021-09-30 PROCEDURE — G0476 HPV COMBO ASSAY CA SCREEN: HCPCS | Performed by: NURSE PRACTITIONER

## 2021-09-30 PROCEDURE — 87591 N.GONORRHOEAE DNA AMP PROB: CPT | Performed by: NURSE PRACTITIONER

## 2021-09-30 PROCEDURE — G0145 SCR C/V CYTO,THINLAYER,RESCR: HCPCS | Performed by: NURSE PRACTITIONER

## 2021-09-30 PROCEDURE — 87491 CHLMYD TRACH DNA AMP PROBE: CPT | Performed by: NURSE PRACTITIONER

## 2021-09-30 PROCEDURE — 90471 IMMUNIZATION ADMIN: CPT

## 2021-09-30 PROCEDURE — 87210 SMEAR WET MOUNT SALINE/INK: CPT | Performed by: NURSE PRACTITIONER

## 2021-09-30 RX ORDER — FLUCONAZOLE 150 MG/1
150 TABLET ORAL ONCE
Qty: 2 TABLET | Refills: 0 | Status: SHIPPED | OUTPATIENT
Start: 2021-09-30 | End: 2021-09-30

## 2021-09-30 NOTE — PROGRESS NOTES
Gabino Ghotra is a 23 y o  female here for a routine gynecologic exam       Current complaints:   Patient with no complaints at this time  Gynecologic History  Patient's last menstrual period was 08/17/2021 (within days)  Contraception: none  Last Pap: 1st pap today  Last mammogram:NA  Last colonoscopy: NA      Obstetric History        NULLIGRAVIDA  OB History        The following portions of the patient's history were reviewed and updated as appropriate: allergies, current medications, past family history, past medical history, past social history, past surgical history and problem list     Review of Systems   Constitutional: Negative for activity change, chills, fatigue and fever  Respiratory: Negative for cough, chest tightness, shortness of breath and wheezing  Gastrointestinal: Negative for blood in stool, constipation, diarrhea, nausea and vomiting  Genitourinary: Positive for vaginal discharge  Negative for difficulty urinating, dysuria, frequency, pelvic pain, urgency and vaginal pain  Skin: Negative for rash  Psychiatric/Behavioral: Negative for agitation and confusion  Objective    Physical Exam  Vitals and nursing note reviewed  Exam conducted with a chaperone present  Constitutional:       General: She is not in acute distress  Appearance: Normal appearance  She is not ill-appearing, toxic-appearing or diaphoretic  HENT:      Head: Normocephalic and atraumatic  Nose: No rhinorrhea  Eyes:      General: No scleral icterus  Right eye: No discharge  Left eye: No discharge  Pulmonary:      Effort: Pulmonary effort is normal  No respiratory distress  Genitourinary:     General: Normal vulva  Pubic Area: No rash or pubic lice  Labia:         Right: No rash, tenderness, lesion or injury  Left: No rash, tenderness, lesion or injury  Vagina: Vaginal discharge (copious white thick d/c noted on exam) present   No erythema, tenderness or bleeding  Cervix: Normal       Uterus: Normal        Adnexa: Right adnexa normal and left adnexa normal         Right: No tenderness  Left: No tenderness  Musculoskeletal:         General: Normal range of motion  Cervical back: Normal range of motion  Lymphadenopathy:      Lower Body: No right inguinal adenopathy  No left inguinal adenopathy  Skin:     Coloration: Skin is not jaundiced or pale  Findings: No bruising, erythema, lesion or rash  Neurological:      Mental Status: She is alert and oriented to person, place, and time  Psychiatric:         Mood and Affect: Mood normal          Behavior: Behavior normal          Thought Content: Thought content normal          Judgment: Judgment normal          Assessment  Encounter Diagnosis     ICD-10-CM    1  Routine gynecological examination  Z01 419 Liquid-based pap, screening     Chlamydia/GC amplified DNA by PCR     Chlamydia/GC amplified DNA by PCR     HPV High Risk    Pap done and sent out  2  Vaginal discharge  N89 8 POCT wet mount    copious white thick d/c noted on exam  Completed Wet Mount- +yeast    3  Yeast infection  B37 9 fluconazole (DIFLUCAN) 150 mg tablet   4  Screening for chlamydial disease  Z11 8 Chlamydia/GC amplified DNA by PCR     Chlamydia/GC amplified DNA by PCR   5  Screening for STD (sexually transmitted disease)  Z11 3 Hepatitis C antibody     HIV 1/2 Antigen/Antibody (4th Generation) w Reflex [facility]     RPR     CANCELED: Hepatitis B surface antibody   6  Need for varicella vaccine  Z23 Varicella Vaccine SQ    Educated on s/e and contraindications  Willing to receive today  Healthy female exam       Plan     Education reviewed: safe sex/STD prevention and self breast exams  Follow up in: 1 year

## 2021-10-02 NOTE — ASSESSMENT & PLAN NOTE
New Diagnosis acute symptomatic, + wet mount for yeast  Will recommend take diflucan 150mg tablet once, if no improvement in 3 days take a second diflucan 150mg  Educated on s/e and proper use of medication  Patient to call with no improvement

## 2021-10-02 NOTE — PROGRESS NOTES
Assessment/Plan:    Yeast infection  New Diagnosis acute symptomatic, + wet mount for yeast  Will recommend take diflucan 150mg tablet once, if no improvement in 3 days take a second diflucan 150mg  Educated on s/e and proper use of medication  Patient to call with no improvement  Vaginal discharge  copious white thick d/c noted on exam  Completed Wet Mount- +yeast        Diagnoses and all orders for this visit:    Routine gynecological examination  Comments:  Pap done and sent out  Orders:  -     Liquid-based pap, screening  -     Chlamydia/GC amplified DNA by PCR; Future  -     Chlamydia/GC amplified DNA by PCR  -     HPV High Risk    Vaginal discharge  Comments:  copious white thick d/c noted on exam  Completed Wet Mount- +yeast   Orders:  -     POCT wet mount    Yeast infection  -     fluconazole (DIFLUCAN) 150 mg tablet; Take 1 tablet (150 mg total) by mouth once for 1 dose Take one tablet if no improvement in 3 days take another  Screening for chlamydial disease  -     Chlamydia/GC amplified DNA by PCR; Future  -     Chlamydia/GC amplified DNA by PCR    Screening for STD (sexually transmitted disease)  -     Cancel: Hepatitis B surface antibody; Future  -     Hepatitis C antibody; Future  -     HIV 1/2 Antigen/Antibody (4th Generation) w Reflex [facility]; Future  -     RPR; Future    Need for varicella vaccine  Comments:  Educated on s/e and contraindications  Willing to receive today  Orders: -     Varicella Vaccine SQ          Subjective:      Patient ID: Olga Book is a 23 y o  female  Gynecologic Exam  The patient's primary symptoms include vaginal discharge  The patient's pertinent negatives include no genital itching, genital lesions, genital odor, genital rash, pelvic pain or vaginal bleeding  This is a new problem  The problem has been unchanged  The patient is experiencing no pain  She is not pregnant   Pertinent negatives include no abdominal pain, back pain, chills, constipation, diarrhea, dysuria, fever, frequency, headaches, hematuria, nausea, painful intercourse, rash or sore throat  The vaginal discharge was copious, thick and white  There has been no bleeding  Nothing aggravates the symptoms  She has tried nothing for the symptoms  The treatment provided no relief  She uses nothing for contraception  The following portions of the patient's history were reviewed and updated as appropriate: allergies, current medications, past family history, past medical history, past social history, past surgical history and problem list     Review of Systems   Constitutional: Negative for appetite change, chills, fatigue and fever  HENT: Negative for congestion, rhinorrhea, sneezing and sore throat  Respiratory: Negative for cough, chest tightness, shortness of breath and wheezing  Cardiovascular: Negative for chest pain and palpitations  Gastrointestinal: Negative for abdominal pain, blood in stool, constipation, diarrhea and nausea  Genitourinary: Positive for vaginal discharge  Negative for dysuria, frequency, hematuria, pelvic pain, vaginal bleeding and vaginal pain  Musculoskeletal: Negative for back pain  Skin: Negative for rash  Neurological: Negative for headaches  Objective:      /70 (BP Location: Left arm, Patient Position: Sitting, Cuff Size: Large)   Pulse 84   Temp 98 °F (36 7 °C) (Tympanic)   Wt 80 kg (176 lb 6 4 oz)   LMP 08/17/2021 (Within Days)   SpO2 98%   BMI 29 58 kg/m²          Physical Exam  Exam conducted with a chaperone present  Constitutional:       General: She is not in acute distress  Appearance: Normal appearance  She is not ill-appearing or diaphoretic  HENT:      Head: Normocephalic and atraumatic  Nose: No congestion or rhinorrhea  Eyes:      General:         Right eye: No discharge  Left eye: No discharge  Pulmonary:      Effort: Pulmonary effort is normal  No respiratory distress     Genitourinary: General: Normal vulva  Pubic Area: No rash or pubic lice  Labia:         Right: No rash or tenderness  Left: No rash or tenderness  Vagina: Vaginal discharge (copious white d/c noted on exam) present  No erythema or bleeding  Adnexa: Right adnexa normal and left adnexa normal    Musculoskeletal:         General: Normal range of motion  Cervical back: Normal range of motion  Skin:     Coloration: Skin is not jaundiced or pale  Findings: No bruising, erythema or rash  Neurological:      General: No focal deficit present  Mental Status: She is alert and oriented to person, place, and time  Psychiatric:         Mood and Affect: Mood normal          Behavior: Behavior normal          Thought Content:  Thought content normal          Judgment: Judgment normal

## 2021-10-05 LAB
HPV HR 12 DNA CVX QL NAA+PROBE: NEGATIVE
HPV16 DNA CVX QL NAA+PROBE: NEGATIVE
HPV18 DNA CVX QL NAA+PROBE: NEGATIVE

## 2021-10-06 ENCOUNTER — TELEPHONE (OUTPATIENT)
Dept: FAMILY MEDICINE CLINIC | Facility: CLINIC | Age: 26
End: 2021-10-06

## 2021-10-06 LAB
C TRACH DNA SPEC QL NAA+PROBE: NEGATIVE
LAB AP GYN PRIMARY INTERPRETATION: NORMAL
LAB AP LMP: NORMAL
Lab: NORMAL
N GONORRHOEA DNA SPEC QL NAA+PROBE: NEGATIVE

## 2022-01-11 ENCOUNTER — OFFICE VISIT (OUTPATIENT)
Dept: OBGYN CLINIC | Facility: CLINIC | Age: 27
End: 2022-01-11
Payer: COMMERCIAL

## 2022-01-11 VITALS
TEMPERATURE: 97.1 F | BODY MASS INDEX: 31.62 KG/M2 | HEART RATE: 71 BPM | WEIGHT: 185.2 LBS | SYSTOLIC BLOOD PRESSURE: 102 MMHG | HEIGHT: 64 IN | DIASTOLIC BLOOD PRESSURE: 60 MMHG

## 2022-01-11 DIAGNOSIS — N76.0 BV (BACTERIAL VAGINOSIS): ICD-10-CM

## 2022-01-11 DIAGNOSIS — Z01.419 ENCOUNTER FOR ANNUAL ROUTINE GYNECOLOGICAL EXAMINATION: Primary | ICD-10-CM

## 2022-01-11 DIAGNOSIS — Z11.3 SCREEN FOR STD (SEXUALLY TRANSMITTED DISEASE): ICD-10-CM

## 2022-01-11 DIAGNOSIS — B96.89 BV (BACTERIAL VAGINOSIS): ICD-10-CM

## 2022-01-11 DIAGNOSIS — N89.8 LEUKORRHEA: ICD-10-CM

## 2022-01-11 PROBLEM — B37.9 YEAST INFECTION: Status: RESOLVED | Noted: 2021-09-30 | Resolved: 2022-01-11

## 2022-01-11 PROBLEM — Z12.4 SCREENING FOR CERVICAL CANCER: Status: RESOLVED | Noted: 2021-02-03 | Resolved: 2022-01-11

## 2022-01-11 PROBLEM — Z02.89 ENCOUNTER FOR PHYSICAL EXAMINATION RELATED TO EMPLOYMENT: Status: RESOLVED | Noted: 2021-05-18 | Resolved: 2022-01-11

## 2022-01-11 PROBLEM — Z00.01 ENCOUNTER FOR WELL ADULT EXAM WITH ABNORMAL FINDINGS: Status: RESOLVED | Noted: 2019-09-05 | Resolved: 2022-01-11

## 2022-01-11 PROBLEM — E55.9 VITAMIN D DEFICIENCY: Status: RESOLVED | Noted: 2020-06-12 | Resolved: 2022-01-11

## 2022-01-11 LAB
BV WHIFF TEST VAG QL: ABNORMAL
CLUE CELLS SPEC QL WET PREP: ABNORMAL
PH SMN: 5 [PH]
SL AMB POCT WET MOUNT: ABNORMAL
T VAGINALIS VAG QL WET PREP: ABNORMAL
YEAST VAG QL WET PREP: ABNORMAL

## 2022-01-11 PROCEDURE — 87210 SMEAR WET MOUNT SALINE/INK: CPT | Performed by: CLINICAL NURSE SPECIALIST

## 2022-01-11 PROCEDURE — 99385 PREV VISIT NEW AGE 18-39: CPT | Performed by: CLINICAL NURSE SPECIALIST

## 2022-01-11 PROCEDURE — 3008F BODY MASS INDEX DOCD: CPT | Performed by: CLINICAL NURSE SPECIALIST

## 2022-01-11 PROCEDURE — 87491 CHLMYD TRACH DNA AMP PROBE: CPT | Performed by: CLINICAL NURSE SPECIALIST

## 2022-01-11 PROCEDURE — 87591 N.GONORRHOEAE DNA AMP PROB: CPT | Performed by: CLINICAL NURSE SPECIALIST

## 2022-01-11 PROCEDURE — 1036F TOBACCO NON-USER: CPT | Performed by: CLINICAL NURSE SPECIALIST

## 2022-01-11 RX ORDER — METRONIDAZOLE 500 MG/1
500 TABLET ORAL 2 TIMES DAILY
Qty: 14 TABLET | Refills: 0 | Status: SHIPPED | OUTPATIENT
Start: 2022-01-11 | End: 2022-01-18

## 2022-01-11 RX ORDER — METOPROLOL SUCCINATE 25 MG/1
TABLET, EXTENDED RELEASE ORAL
COMMUNITY
Start: 2021-12-13

## 2022-01-11 NOTE — ASSESSMENT & PLAN NOTE
Exam c/w BV meeting Amsel's criteria  Reviewed common treatment options including oral flagyl vs metrogel  Desires oral tx-advised to avoid alcohol while taking due to interaction  Also discussed preventive measures including always wiping from front to back, wear only cotton underwear, use unscented soaps and detergent, do not use dryer sheets in laundry with underwear, do not douche and avoidance of tight fitting pants   Education handout given

## 2022-01-11 NOTE — PATIENT INSTRUCTIONS
Preconception instructions   Start tracking menstrual cycle to determine ovulation/"fertile window" (usually around 2 wks before start of period)  Have sex every other day around this time frame  o An ovum or egg can be fertilized by sperm for about 24 hours after its been released(ovulated)  Sperm can survive for up to 72 hours  So you must have sex the day before, the day of, or even possibly the day after ovulation in order to get pregnant   o For example- If you have a normal 28 day cycle  You would ovulate around day 14  Thus you would want to plan to have sex -every other day- from day 11- day 18   Please start taking a daily Multivitamin (with folic acid), prenatal vitamin, OR folic acid supplement (Should contain at least 400-800 mcg)      Continue to strive towards a healthy lifestyle, including a well balanced diet and exercise   Please avoid smoking, alcohol, drugs and medications that can be harmful to pregnancy   Limit caffeine intake to about 200 mg per day  Consider avoiding klonopin in pregnancy    ______  HPV (Human Papillomavirus)   WHAT YOU NEED TO KNOW:   What is human papillomavirus (HPV)? HPV is the most common infection spread by sexual contact  It can also be spread from a mother to her baby during delivery  HPV may cause oral and genital warts or tumors in your nose, mouth, throat, and lungs  HPV may also cause vaginal, penile, and anal cancers  You may not show symptoms of any of these conditions for several years after being exposed to HPV  What are the symptoms of HPV?  Painless warts    Genital or anal discharge, bleeding, itching, or pain     Pain when you urinate  How is HPV diagnosed? Your healthcare provider may use a vinegar liquid to help diagnose HPV genital warts  Women 27to 72years old can be checked for HPV during regular cervical cancer screenings  An HPV test checks for certain types of HPV that can cause changes in cervical cells   Without treatment, the changed cells can become cancer  An HPV test can be done every 5 years if the results show no infection  The test can be done with or without a Pap smear  A Pap smear checks for cancer or for abnormal cells that can become cancer  You may be tested for HPV if you are diagnosed with a mouth or throat cancer  How is HPV treated? HPV cannot be cured  Conditions that are caused by HPV can be treated  You will need to be monitored closely for these conditions  Ask your healthcare provider for more information about monitoring, conditions caused by HPV, and available treatments  How can HPV infection be prevented? · Ask about the HPV vaccine  The vaccine can help protect against HPV infection  Females and males can receive the vaccine  It is most effective if given before sexual activity begins  This allows the body to build almost complete protection against HPV before contact with the virus  The vaccine is usually given at 6or 15years of age but may be given as early as 5 years  The vaccine can be given through age 39  · Always use a condom during intercourse  A condom will not completely protect you from HPV infection, but it will help lower your risk  Use a new condom or latex barrier each time you have sex  This includes oral, vaginal, and anal sex  Make sure the condom fits and is put on correctly  Rubber latex sheets or dental dams can be used for oral sex  If you are allergic to latex, use a nonlatex product such as polyurethane  When should I call my doctor? · You have warts in your genital or anal area  · You have genital or anal discharge, bleeding, itching, or pain  · You have pain when you urinate  · You have questions or concerns about your condition or care  CARE AGREEMENT:   You have the right to help plan your care  Learn about your health condition and how it may be treated   Discuss treatment options with your healthcare providers to decide what care you want to receive  You always have the right to refuse treatment  The above information is an  only  It is not intended as medical advice for individual conditions or treatments  Talk to your doctor, nurse or pharmacist before following any medical regimen to see if it is safe and effective for you  © Copyright [address] Information is for End User's use only and may not be sold, redistributed or otherwise used for commercial purposes  All illustrations and images included in CareNotes® are the copyrighted property of [employer]  or [facility] Ivan Hicks         Vaginosis/Vaginitis Education/tips to prevent recurrence    Yeast infection, or vulvovaginal candidiasis, is a common vaginal infection  Vulvovaginal candidiasis is caused by a fungus, or yeast-like germ  Fungi are normally found in your vagina  Too many fungi can cause an infection  Bacterial vaginosis  is an infection in the vagina  It may cause vaginitis (irritation and inflammation of the vagina)  The cause is not known  Bacteria normally found in the vagina are imbalanced  Common Symptoms/Comparison:  Symptoms Yeast BV   Vaginal discharge Thick/white/clumpy (cottage cheese like) Thin/gray, white, yellow   Irritation Yes- vaginal itching, swelling, redness Less common-itch/irritation outside vagina   Odor No Yes (Fishy)   Burning w/ urination Sometimes No     Treatment- Antibiotics  are given to kill the bacteria/decrease count of bacteria/fungi  They may be given as a pill or as a cream to put in your vagina and varies based on the type of infection  Symptomatic infections can and should be treated during pregnancy     After treatment: Call your doctor or gynecologist if:   · Your symptoms come back or do not improve with treatment  · You have vaginal bleeding that is not your monthly period  · You have questions or concerns about your condition or care  Prevention:   · Do not have sex until your symptoms go away    Have your partner wear a condom until you complete your course of medication  · Clean around your vulva with mild soap and warm water each day  Gently dry the area after washing  Do not use hot tubs  The heat and moisture from hot tubs can increase your risk for another yeast infection  · Keep your vaginal area clean and dry  Wear underwear with a cotton crotch  Wipe from front to back after you urinate or have a bowel movement  After you bathe, rinse soap from your vaginal area to decrease your risk for irritation  · Do not use products that cause irritation  Always use unscented tampons or sanitary pads  Do not use feminine sprays, powders, or scented tampons  They may cause irritation and increase your risk for vaginosis  Detergents and fabric softeners may also cause irritation and should be perfume free and Dye free  · Do not use a douche  This can cause an imbalance in healthy vaginal bacteria  · Use latex condoms during sex  This helps prevent another infection, or other STD  · Always wipe from front to back  after you use the toilet  This prevents spreading bacteria from your rectal area into your vagina  · Do not wear tight-fitting clothes or undergarments  for long periods  Wear cotton underwear during the day  Cotton helps keep your genital area dry and does not hold in warmth or moisture  Do not wear underwear at night

## 2022-01-11 NOTE — PROGRESS NOTES
Subjective:        Michelle Flood is a 23 y o  female  Here for New Patient Visit (Patient here for yearly visit and c/o white vaginal discharge after intercourse, has an odor, itchy  These sx are generally off and on  Also wants to discuss conceiving later this year ) and Gynecologic Exam      GYN HPI  Here for annual gyn exam  Regarding menstrual cycle: Patient denies menstrual complaints  Regular monthly menses, not excessive  She denies any abnormal  urinary complaints  She does c/o of an increased d/c prior to onset of menses- slightly itching with an odor  Also noting increased d/c and irritation after sex with fishy odor    Denies changes or concerns r/t breasts  She sometimes performs self breast exams  She reports she generally eats a healthy diet and does exercise regularly  She does not get dietary calcium/vit D  She denies any untreated or poorly controlled anxiety or depression sxs  Hx of - sees psychiatry  She is not sexually active  Contraception: none  She reports that she  feel safe at home  The following portions of the patient's history were reviewed and updated as appropriate: allergies, current medications, past family history, past medical history, past social history, past surgical history, and problem list       Hereditary Cancer Screening  Family history reviewed and patient does not meet criteria for referral for genetic cancer assessment  Substance Abuse Screening Completed  See hx and flowsheet  Screens Positive for Occasional/social alcohol intake      HEALTH MAINTENANCE SCREENINGS:    History of abnormal pap: No, Last Papanicolaou test:  09/30/2021    IMMUNIZATIONS  Gardasil HPV vaccine was not completed  Flu vaccine: declined  Covid Vaccine status: Completed 11/21    Review of Systems   Constitutional: Negative for appetite change, chills, fatigue, fever and unexpected weight change  HENT: Negative  Eyes: Negative      Respiratory: Negative for chest tightness and shortness of breath  Cardiovascular: Negative for chest pain and palpitations  Gastrointestinal: Negative for abdominal pain, constipation and vomiting  Endocrine: Negative for cold intolerance and heat intolerance  Genitourinary:        As per HPI   Musculoskeletal: Negative for back pain, joint swelling and neck pain  Skin: Negative for color change and rash  Neurological: Negative for dizziness, weakness and numbness  Hematological: Does not bruise/bleed easily  Psychiatric/Behavioral: Negative  Objective:  /60 (BP Location: Right arm, Patient Position: Sitting, Cuff Size: Adult)   Pulse 71   Temp (!) 97 1 °F (36 2 °C) (Temporal)   Ht 5' 4" (1 626 m)   Wt 84 kg (185 lb 3 2 oz)   LMP 01/01/2022 (Approximate)   BMI 31 79 kg/m²        Physical Exam  Constitutional:       General: She is not in acute distress  Appearance: Normal appearance  Genitourinary:      Vulva and rectum normal       No lesions in the vagina  Right Labia: No rash or lesions  Left Labia: No lesions or rash  Vaginal discharge (thin white slight malodor) present  No vaginal erythema, tenderness or bleeding  Right Adnexa: not tender and no mass present  Left Adnexa: not tender and no mass present  No cervical motion tenderness, discharge or friability  Uterus is not enlarged or tender  No urethral prolapse present  Pelvic exam was performed with patient in the lithotomy position  Breasts: Breasts are symmetrical       Right: No inverted nipple, mass, nipple discharge, skin change or tenderness  Left: No inverted nipple, mass, nipple discharge, skin change or tenderness  HENT:      Head: Normocephalic and atraumatic  Cardiovascular:      Rate and Rhythm: Normal rate  Heart sounds: No murmur heard  Pulmonary:      Effort: Pulmonary effort is normal       Breath sounds: Normal breath sounds     Abdominal:      General: There is no distension  Palpations: Abdomen is soft  Tenderness: There is no abdominal tenderness  Musculoskeletal:         General: Normal range of motion  Cervical back: Normal range of motion  Lymphadenopathy:      Cervical: No cervical adenopathy  Neurological:      Mental Status: She is alert and oriented to person, place, and time  Skin:     General: Skin is warm and dry  Psychiatric:         Mood and Affect: Mood normal          Behavior: Behavior normal    Vitals reviewed  Results for orders placed or performed in visit on 01/11/22   POCT wet mount   Result Value Ref Range    WET MOUNT neg     Yeast, Wet Prep neg     pH 5     Whiff Test pos     Clue Cells pos     Trich, Wet Prep absent        Assessment/Plan:       ANNUAL GYN EXAM- Primary  Annual GYN examination completed today  Risk prevention and anticipatory guidance provided including:   Pap/breast screenings- see below   Risk for hereditary cancers: Family history reviewed and patient does not qualify for referral for genetics consult/testing  Referral to genetics oncology offered as indicated   Calcium and vitamin D supplementation   Risk factors for lipid, diabetes and thryroid screening, ordered if needed   Dietary and lifestyle recommendations based on her age and weight  body mass index is 31 79 kg/m²  Dawson Gonzales PHQ-2 depression screen completed  Reviewed and interventions recommended if needed   Tobacco and alcohol use, intervention ordered if applicable  Cervical cancer screening  Previous pap smears and ASCCP screening guidelines have been reviewed  Pap smear is not indicated at this time  Contraception   declined    STI Screening  STI screening is desired   STI prevention discussed with use of condoms  Breast exam and breast cancer screening  Breast exam was done; breast cancer imaging/screening is not indicated at this time      Problem List Items Addressed This Visit     BV (bacterial vaginosis)     Exam c/w BV meeting Amsel's criteria  Reviewed common treatment options including oral flagyl vs metrogel  Desires oral tx-advised to avoid alcohol while taking due to interaction  Also discussed preventive measures including always wiping from front to back, wear only cotton underwear, use unscented soaps and detergent, do not use dryer sheets in laundry with underwear, do not douche and avoidance of tight fitting pants   Education handout given           Relevant Medications    metroNIDAZOLE (FLAGYL) 500 mg tablet      Other Visit Diagnoses     Encounter for annual routine gynecological examination    -  Primary    Screen for STD (sexually transmitted disease)        Relevant Orders    Chlamydia/GC amplified DNA by PCR    Leukorrhea        Relevant Orders    POCT wet mount (Completed)          Orders Placed This Encounter   Procedures    Chlamydia/GC amplified DNA by PCR    POCT wet mount

## 2022-01-13 LAB
C TRACH DNA SPEC QL NAA+PROBE: NEGATIVE
N GONORRHOEA DNA SPEC QL NAA+PROBE: NEGATIVE

## 2022-09-14 ENCOUNTER — OFFICE VISIT (OUTPATIENT)
Dept: FAMILY MEDICINE CLINIC | Facility: CLINIC | Age: 27
End: 2022-09-14

## 2022-09-14 VITALS
HEART RATE: 71 BPM | BODY MASS INDEX: 35.17 KG/M2 | RESPIRATION RATE: 16 BRPM | OXYGEN SATURATION: 99 % | DIASTOLIC BLOOD PRESSURE: 76 MMHG | TEMPERATURE: 98.3 F | SYSTOLIC BLOOD PRESSURE: 112 MMHG | WEIGHT: 206 LBS | HEIGHT: 64 IN

## 2022-09-14 DIAGNOSIS — Z13.220 ENCOUNTER FOR SCREENING FOR LIPID DISORDER: ICD-10-CM

## 2022-09-14 DIAGNOSIS — Z00.01 ENCOUNTER FOR ROUTINE ADULT PHYSICAL EXAM WITH ABNORMAL FINDINGS: Primary | ICD-10-CM

## 2022-09-14 DIAGNOSIS — E66.09 CLASS 2 OBESITY DUE TO EXCESS CALORIES WITHOUT SERIOUS COMORBIDITY WITH BODY MASS INDEX (BMI) OF 35.0 TO 35.9 IN ADULT: ICD-10-CM

## 2022-09-14 DIAGNOSIS — Z11.1 SCREENING FOR TUBERCULOSIS: ICD-10-CM

## 2022-09-14 DIAGNOSIS — Z13.29 SCREENING FOR THYROID DISORDER: ICD-10-CM

## 2022-09-14 DIAGNOSIS — Z00.00 LABORATORY EXAM ORDERED AS PART OF ROUTINE GENERAL MEDICAL EXAMINATION: ICD-10-CM

## 2022-09-14 PROBLEM — E66.812 CLASS 2 OBESITY DUE TO EXCESS CALORIES WITHOUT SERIOUS COMORBIDITY WITH BODY MASS INDEX (BMI) OF 35.0 TO 35.9 IN ADULT: Status: ACTIVE | Noted: 2022-09-14

## 2022-09-14 PROCEDURE — 99395 PREV VISIT EST AGE 18-39: CPT | Performed by: FAMILY MEDICINE

## 2022-09-14 RX ORDER — BUSPIRONE HYDROCHLORIDE 30 MG/1
30 TABLET ORAL DAILY
COMMUNITY
Start: 2022-07-20

## 2022-09-14 NOTE — PATIENT INSTRUCTIONS

## 2022-09-14 NOTE — PROGRESS NOTES
ADULT ANNUAL PHYSICAL  [address] PRIMARY CARE [facility]    NAME: Pk Adrian  AGE: 33 y o  SEX: female  : 1995     DATE: 2022     Assessment and Plan:     Problem List Items Addressed This Visit        Other    Encounter for routine adult physical exam with abnormal findings - Primary     Advice and education were given regarding nutrition, aerobic exercises, weight bearing exercises, cardiovascular risk reduction, fall risk reduction, and age appropriate supplements  The patient was counseled regarding instructions for management, risk factor reductions, prognosis, risks and benefits of treatment options, patient and family education, and importance of compliance with treatment  Class 2 obesity due to excess calories without serious comorbidity with body mass index (BMI) of 35 0 to 35 9 in adult     The BMI is above average  BMI counseling and education was provided to the patient  Nutrition recommendations include reducing portion sizes, decreasing overall calorie intake, 3-5 servings of fruits/vegetables daily, reducing fast food intake, consuming healthier snacks, decreasing soda and/or juice intake, moderation in carbohydrate intake and reducing intake of saturated fat and trans fat  Exercise recommendations include moderate aerobic physical activity for 150 minutes/week, exercising 3-5 times per week and joining a gym           Relevant Orders    Lipid Panel with Direct LDL reflex    TSH, 3rd generation with Free T4 reflex      Other Visit Diagnoses     Screening for tuberculosis        Relevant Orders    Quantiferon TB Gold Plus    Laboratory exam ordered as part of routine general medical examination        Relevant Orders    Lipid Panel with Direct LDL reflex    TSH, 3rd generation with Free T4 reflex    Encounter for screening for lipid disorder        Relevant Orders    Lipid Panel with Direct LDL reflex    TSH, 3rd generation with Free T4 reflex    Screening for thyroid disorder        Relevant Orders    TSH, 3rd generation with Free T4 reflex          Immunizations and preventive care screenings were discussed with patient today  Appropriate education was printed on patient's after visit summary  Counseling:  Alcohol/drug use: discussed moderation in alcohol intake, the recommendations for healthy alcohol use, and avoidance of illicit drug use  Dental Health: discussed importance of regular tooth brushing, flossing, and dental visits  Injury prevention: discussed safety/seat belts, safety helmets, smoke detectors, carbon dioxide detectors, and smoking near bedding or upholstery  Sexual health: discussed sexually transmitted diseases, partner selection, use of condoms, avoidance of unintended pregnancy, and contraceptive alternatives  Exercise: the importance of regular exercise/physical activity was discussed  Recommend exercise 3-5 times per week for at least 30 minutes  BMI Counseling: Body mass index is 35 36 kg/m²  The BMI is above normal  Nutrition recommendations include decreasing portion sizes, decreasing fast food intake and limiting drinks that contain sugar  Exercise recommendations include exercising 3-5 times per week  No pharmacotherapy was ordered  Rationale for BMI follow-up plan is due to patient being overweight or obese  Depression Screening and Follow-up Plan: Patient was screened for depression during today's encounter  They screened negative with a PHQ-2 score of 0  Return in about 1 year (around 9/14/2023)  Chief Complaint:     Chief Complaint   Patient presents with    Physical Exam     Patient Is here today for her yearly physical exam       History of Present Illness:     Adult Annual Physical   Patient here for a comprehensive physical exam  The patient reports no problems  Diet and Physical Activity  Diet/Nutrition: No special diet  Exercise: no formal exercise  Depression Screening  PHQ-2/9 Depression Screening    Little interest or pleasure in doing things: 0 - not at all  Feeling down, depressed, or hopeless: 0 - not at all  PHQ-2 Score: 0  PHQ-2 Interpretation: Negative depression screen       General Health  Sleep: sleeps well  Hearing: normal - bilateral   Vision: no vision problems  Dental: regular dental visits  /GYN Health  Contraceptive method: none  History of STDs?: yes  2016 Chlamydia     Review of Systems:     Review of Systems   Constitutional: Negative for chills and fever  HENT: Negative for ear pain and sore throat  Eyes: Negative for pain and visual disturbance  Respiratory: Negative for cough and shortness of breath  Cardiovascular: Negative for chest pain and palpitations  Gastrointestinal: Negative for abdominal pain and vomiting  Genitourinary: Negative for dysuria and hematuria  Musculoskeletal: Negative for arthralgias and back pain  Skin: Negative for color change and rash  Neurological: Negative for seizures and syncope  All other systems reviewed and are negative       Past Medical History:     Past Medical History:   Diagnosis Date    Anxiety 09/05/2019    AV block, 1st degree     BMI 29 0-29 9,adult 2/24/2021    Chlamydia 2015    COVID-19 virus infection 01/11/2021    Obesity     SOB (shortness of breath) 02/23/2021      Past Surgical History:     Past Surgical History:   Procedure Laterality Date    ELBOW SURGERY Right       Social History:     Social History     Socioeconomic History    Marital status: Single     Spouse name: None    Number of children: None    Years of education: None    Highest education level: None   Occupational History    None   Tobacco Use    Smoking status: Never Smoker    Smokeless tobacco: Never Used   Vaping Use    Vaping Use: Never used   Substance and Sexual Activity    Alcohol use: Yes     Comment: socially    Drug use: Never    Sexual activity: Yes Partners: Male     Birth control/protection: None   Other Topics Concern    None   Social History Narrative    None     Social Determinants of Health     Financial Resource Strain: Not on file   Food Insecurity: Not on file   Transportation Needs: Not on file   Physical Activity: Not on file   Stress: Stress Concern Present    Feeling of Stress : Rather much   Social Connections: Not on file   Intimate Partner Violence: Not on file   Housing Stability: Not on file      Family History:     Family History   Problem Relation Age of Onset    Hypertension Father       Current Medications:     Current Outpatient Medications   Medication Sig Dispense Refill    busPIRone (BUSPAR) 30 MG tablet Take 30 mg by mouth daily      clonazePAM (KlonoPIN) 1 mg tablet take 1 tablet by mouth AT NIGHT FOR 30 DAYS      Vitamin D, Cholecalciferol, 50 MCG (2000 UT) CAPS Take 2,000 Int'l Units by mouth daily 30 capsule 1    metoprolol succinate (TOPROL-XL) 25 mg 24 hr tablet take 1 tablet by mouth every 24 hours for 30 DAYS (Patient not taking: Reported on 9/14/2022)       No current facility-administered medications for this visit  Allergies:     No Known Allergies   Physical Exam:     /76 (BP Location: Left arm, Patient Position: Sitting, Cuff Size: Large)   Pulse 71   Temp 98 3 °F (36 8 °C) (Tympanic)   Resp 16   Ht 5' 4" (1 626 m)   Wt 93 4 kg (206 lb)   LMP 07/30/2022 (Approximate)   SpO2 99%   BMI 35 36 kg/m²     Physical Exam  Vitals and nursing note reviewed  Constitutional:       General: She is not in acute distress  Appearance: She is well-developed and normal weight  She is not toxic-appearing or diaphoretic  HENT:      Head: Normocephalic and atraumatic  Right Ear: Tympanic membrane, ear canal and external ear normal  There is no impacted cerumen  Left Ear: Tympanic membrane, ear canal and external ear normal  There is no impacted cerumen        Nose: Nose normal  No congestion or rhinorrhea  Mouth/Throat:      Mouth: Mucous membranes are moist       Pharynx: Oropharynx is clear  No oropharyngeal exudate or posterior oropharyngeal erythema  Eyes:      General: No scleral icterus  Right eye: No discharge  Left eye: No discharge  Conjunctiva/sclera: Conjunctivae normal       Pupils: Pupils are equal, round, and reactive to light  Neck:      Thyroid: No thyromegaly  Cardiovascular:      Rate and Rhythm: Normal rate and regular rhythm  Pulses: Normal pulses  Heart sounds: Normal heart sounds  No murmur heard  No friction rub  Pulmonary:      Effort: Pulmonary effort is normal  No respiratory distress  Breath sounds: Normal breath sounds  No wheezing or rales  Chest:      Chest wall: No tenderness  Abdominal:      General: There is no distension  Palpations: Abdomen is soft  There is no mass  Tenderness: There is no abdominal tenderness  Hernia: No hernia is present  There is no hernia in the left inguinal area  Genitourinary:     Labia:         Right: No rash  Left: No rash  Vagina: No foreign body  No tenderness or bleeding  Cervix: No cervical motion tenderness  Adnexa:         Right: No mass or tenderness  Left: No mass or tenderness  Musculoskeletal:         General: Normal range of motion  Cervical back: Normal range of motion  No rigidity  Lymphadenopathy:      Cervical: No cervical adenopathy  Skin:     General: Skin is warm  Coloration: Skin is not pale  Findings: No erythema or rash  Neurological:      Mental Status: She is alert and oriented to person, place, and time  Sensory: No sensory deficit  Motor: No weakness or abnormal muscle tone        Gait: Gait normal       Deep Tendon Reflexes: Reflexes normal    Psychiatric:         Mood and Affect: Mood normal          Behavior: Behavior normal           Shayan Manzano MD   [address]  [address]

## 2023-01-23 ENCOUNTER — ANNUAL EXAM (OUTPATIENT)
Dept: OBGYN CLINIC | Facility: CLINIC | Age: 28
End: 2023-01-23

## 2023-01-23 VITALS
HEART RATE: 81 BPM | BODY MASS INDEX: 37.8 KG/M2 | HEIGHT: 64 IN | WEIGHT: 221.4 LBS | DIASTOLIC BLOOD PRESSURE: 78 MMHG | TEMPERATURE: 98.2 F | OXYGEN SATURATION: 96 % | SYSTOLIC BLOOD PRESSURE: 116 MMHG

## 2023-01-23 DIAGNOSIS — B37.9 YEAST INFECTION: ICD-10-CM

## 2023-01-23 DIAGNOSIS — B96.89 BV (BACTERIAL VAGINOSIS): ICD-10-CM

## 2023-01-23 DIAGNOSIS — Z01.419 ENCOUNTER FOR ANNUAL ROUTINE GYNECOLOGICAL EXAMINATION: Primary | ICD-10-CM

## 2023-01-23 DIAGNOSIS — N76.0 BV (BACTERIAL VAGINOSIS): ICD-10-CM

## 2023-01-23 DIAGNOSIS — N76.0 BACTERIAL VAGINOSIS: ICD-10-CM

## 2023-01-23 DIAGNOSIS — B96.89 BACTERIAL VAGINOSIS: ICD-10-CM

## 2023-01-23 DIAGNOSIS — Z11.3 SCREENING FOR STD (SEXUALLY TRANSMITTED DISEASE): ICD-10-CM

## 2023-01-23 LAB
BV WHIFF TEST VAG QL: ABNORMAL
C TRACH DNA SPEC QL NAA+PROBE: NEGATIVE
CLUE CELLS SPEC QL WET PREP: PRESENT
N GONORRHOEA DNA SPEC QL NAA+PROBE: NEGATIVE
PH SMN: ABNORMAL [PH]
SL AMB POCT WET MOUNT: ABNORMAL
T VAGINALIS VAG QL WET PREP: ABNORMAL
YEAST VAG QL WET PREP: PRESENT

## 2023-01-23 RX ORDER — FLUCONAZOLE 150 MG/1
150 TABLET ORAL ONCE
Qty: 1 TABLET | Refills: 0 | Status: SHIPPED | OUTPATIENT
Start: 2023-01-23 | End: 2023-01-23

## 2023-01-23 RX ORDER — MULTIVITAMIN
1 TABLET ORAL DAILY
COMMUNITY

## 2023-01-23 RX ORDER — METRONIDAZOLE 500 MG/1
500 TABLET ORAL EVERY 12 HOURS SCHEDULED
Qty: 14 TABLET | Refills: 0 | Status: SHIPPED | OUTPATIENT
Start: 2023-01-23 | End: 2023-01-30

## 2023-01-23 RX ORDER — LANOLIN ALCOHOL/MO/W.PET/CERES
400 CREAM (GRAM) TOPICAL DAILY
COMMUNITY

## 2023-01-23 NOTE — ASSESSMENT & PLAN NOTE
Exam findings today consistent with bacterial vaginosis infection  Recommend treatment with flagyl  Advised abstinence from alcohol consumption if taking oral flagyl  Vulvar hygiene measures and medication instructions reviewed

## 2023-01-23 NOTE — ASSESSMENT & PLAN NOTE
Exam findings today consistent with yeast infection  Recommend treatment with diflucan  Vulvar hygiene measures and medication instructions reviewed

## 2023-01-23 NOTE — PROGRESS NOTES
Subjective      Baby Casie is a 33 y o  female who presents for annual exam       Chief Complaint   Patient presents with   • Gynecologic Exam     Annual exam; last PAP 21 negative; pt states she has been having yeast infections intermittently     Vaginal itching, yellow/white, sometimes thick, sometimes foul odor  Feels like symptoms usually recur after sexual activity   Uses vagisil, summers steven frequently  Wants to conceive around summer time, however not using any condoms or contraception currently   Has a psychiatrist - h/o anxiety   Desires STI testing       Last Pap: 2021 NILM       HPV vaccine completed:no  Current contraception: none  History of abnormal Pap smear: no  History of abnormal mammogram: no      Family history of uterine or ovarian cancer: no  Family history of breast cancer: no  Family history of colon cancer: no      Menstrual History:  OB History        0    Para   0    Term   0       0    AB   0    Living   0       SAB   0    IAB   0    Ectopic   0    Multiple   0    Live Births   0                    Patient's last menstrual period was 2023 (exact date)           Past Medical History:   Diagnosis Date   • Anxiety 2019   • AV block, 1st degree    • BMI 29 0-29 9,adult 2021   • Chlamydia    • COVID-19 virus infection 2021   • Obesity    • SOB (shortness of breath) 2021     Past Surgical History:   Procedure Laterality Date   • ELBOW SURGERY Right      Family History   Problem Relation Age of Onset   • Hypertension Father        Social History     Tobacco Use   • Smoking status: Never   • Smokeless tobacco: Never   Vaping Use   • Vaping Use: Never used   Substance Use Topics   • Alcohol use: Yes     Comment: socially   • Drug use: Never          Current Outpatient Medications:   •  busPIRone (BUSPAR) 30 MG tablet, Take 60 mg by mouth daily, Disp: , Rfl:   •  clonazePAM (KlonoPIN) 1 mg tablet, Take 1 mg by mouth 3 (three) times a day, Disp: , Rfl:   •  fluconazole (DIFLUCAN) 150 mg tablet, Take 1 tablet (150 mg total) by mouth once for 1 dose, Disp: 1 tablet, Rfl: 0  •  folic acid (FOLVITE) 113 mcg tablet, Take 400 mcg by mouth daily, Disp: , Rfl:   •  metroNIDAZOLE (FLAGYL) 500 mg tablet, Take 1 tablet (500 mg total) by mouth every 12 (twelve) hours for 7 days, Disp: 14 tablet, Rfl: 0  •  Multiple Vitamin (multivitamin) tablet, Take 1 tablet by mouth daily, Disp: , Rfl:   •  Psyllium (METAMUCIL PO), Take by mouth, Disp: , Rfl:   •  Vitamin D, Cholecalciferol, 50 MCG (2000 UT) CAPS, Take 2,000 Int'l Units by mouth daily, Disp: 30 capsule, Rfl: 1    No Known Allergies        Review of Systems   Constitutional: Negative for appetite change, chills and fever  Eyes: Negative for visual disturbance  Respiratory: Negative for cough, chest tightness and shortness of breath  Cardiovascular: Negative for chest pain  Gastrointestinal: Negative for abdominal distention, abdominal pain, constipation, diarrhea, nausea and vomiting  Endocrine: Negative for cold intolerance and heat intolerance  Genitourinary: Positive for vaginal discharge  Negative for difficulty urinating, dyspareunia, dysuria, frequency, genital sores, pelvic pain, urgency, vaginal bleeding and vaginal pain  Musculoskeletal: Negative for arthralgias  Neurological: Negative for light-headedness and headaches  Hematological: Does not bruise/bleed easily  Psychiatric/Behavioral: Negative for behavioral problems  All other systems reviewed and are negative  /78 (BP Location: Right arm, Patient Position: Sitting, Cuff Size: Adult)   Pulse 81   Temp 98 2 °F (36 8 °C) (Tympanic)   Ht 5' 4" (1 626 m)   Wt 100 kg (221 lb 6 4 oz)   LMP 01/16/2023 (Exact Date)   SpO2 96%   BMI 38 00 kg/m²         Physical Exam  Constitutional:       General: She is not in acute distress  Appearance: Normal appearance     Genitourinary:      Vulva, bladder and urethral meatus normal       No lesions in the vagina  Right Labia: No rash, tenderness, lesions or skin changes  Left Labia: No tenderness, lesions, skin changes or rash  No labial fusion noted  No inguinal adenopathy present in the right or left side  Vaginal discharge present  No vaginal erythema, tenderness, bleeding or ulceration  No vaginal prolapse present  Vaginal exam comments: Thick clumpy vaginal discharge  Right Adnexa: not tender, not full and no mass present  Left Adnexa: not tender, not full and no mass present  No cervical motion tenderness, discharge, friability, lesion or polyp  Uterus is not enlarged, fixed, tender or irregular  No uterine mass detected  Uterus is anteverted  Pelvic exam was performed with patient in the lithotomy position  Breasts:     Right: No swelling, bleeding, inverted nipple, mass, nipple discharge, skin change or tenderness  Left: No swelling, bleeding, inverted nipple, mass, nipple discharge, skin change or tenderness  HENT:      Head: Normocephalic and atraumatic  Neck:      Thyroid: No thyromegaly  Cardiovascular:      Rate and Rhythm: Normal rate and regular rhythm  Pulmonary:      Effort: Pulmonary effort is normal  No accessory muscle usage or respiratory distress  Abdominal:      General: There is no distension  Palpations: Abdomen is soft  Tenderness: There is no abdominal tenderness  There is no guarding or rebound  Musculoskeletal:         General: Normal range of motion  Cervical back: Normal range of motion and neck supple  Lymphadenopathy:      Upper Body:      Right upper body: No supraclavicular or axillary adenopathy  Left upper body: No supraclavicular or axillary adenopathy  Lower Body: No right inguinal and no right inguinal adenopathy  No left inguinal and no left inguinal adenopathy  Neurological:      General: No focal deficit present  Mental Status: She is alert  Skin:     General: Skin is warm and dry  Findings: No erythema  Psychiatric:         Mood and Affect: Mood normal          Behavior: Behavior normal    Vitals and nursing note reviewed  Exam conducted with a chaperone present  Results for orders placed or performed in visit on 01/23/23   POCT wet mount   Result Value Ref Range    WET MOUNT -     Yeast, Wet Prep present     pH elevated     Whiff Test -     Clue Cells present     Trich, Wet Prep absent          Encounter for annual routine gynecological examination  - Discussed ACOG guidelines for pap smear screening frequency: not indicated today  Recommend repeat pap smear in 2024  - Discussed healthy lifestyle recommendations for diet, exercise and self breast awareness   - Discussed ACOG recommendations for screening mammograms: not indicated today  - Discussed age based recommendations for adequate calcium and vitamin D intake  No additional osteoporosis screening indicated at this time  - Discussed ACOG recommendations for colon cancer screening: not indicated at this time  - Safe sex practices were discussed and STI testing was desired, GC/CT/trichomonas sent   - Contraceptive options were reviewed: declines as desires to conceive  Preconception counseling was reviewed  - Routine follow up in 1 year was recommended or sooner as needed  All questions and concerns were addressed  Yeast infection  Exam findings today consistent with yeast infection  Recommend treatment with diflucan  Vulvar hygiene measures and medication instructions reviewed  BV (bacterial vaginosis)  Exam findings today consistent with bacterial vaginosis infection  Recommend treatment with flagyl  Advised abstinence from alcohol consumption if taking oral flagyl  Vulvar hygiene measures and medication instructions reviewed

## 2023-01-23 NOTE — ASSESSMENT & PLAN NOTE
- Discussed ACOG guidelines for pap smear screening frequency: not indicated today  Recommend repeat pap smear in 2024  - Discussed healthy lifestyle recommendations for diet, exercise and self breast awareness   - Discussed ACOG recommendations for screening mammograms: not indicated today  - Discussed age based recommendations for adequate calcium and vitamin D intake  No additional osteoporosis screening indicated at this time  - Discussed ACOG recommendations for colon cancer screening: not indicated at this time  - Safe sex practices were discussed and STI testing was desired, GC/CT/trichomonas sent   - Contraceptive options were reviewed: declines as desires to conceive  Preconception counseling was reviewed  - Routine follow up in 1 year was recommended or sooner as needed  All questions and concerns were addressed

## 2023-01-24 LAB
M GENITALIUM DNA SPEC QL NAA+PROBE: NEGATIVE
T VAGINALIS DNA SPEC QL NAA+PROBE: NEGATIVE

## 2023-05-05 ENCOUNTER — TELEPHONE (OUTPATIENT)
Dept: PSYCHIATRY | Facility: CLINIC | Age: 28
End: 2023-05-05

## 2023-05-05 NOTE — TELEPHONE ENCOUNTER
Patient has been added to the Non-referralMedication Management wait list     Confirmed Insurance: Yes [x]  Location Preference: [place]  Provider Preference: Female   Virtual: Yes [x] No []    Address Verified   Phone Number Verified

## 2023-10-13 ENCOUNTER — OFFICE VISIT (OUTPATIENT)
Dept: FAMILY MEDICINE CLINIC | Facility: CLINIC | Age: 28
End: 2023-10-13

## 2023-10-13 VITALS
WEIGHT: 206.2 LBS | BODY MASS INDEX: 35.2 KG/M2 | SYSTOLIC BLOOD PRESSURE: 120 MMHG | TEMPERATURE: 97.8 F | HEIGHT: 64 IN | DIASTOLIC BLOOD PRESSURE: 66 MMHG | OXYGEN SATURATION: 98 % | HEART RATE: 74 BPM

## 2023-10-13 DIAGNOSIS — F41.9 ANXIETY: ICD-10-CM

## 2023-10-13 DIAGNOSIS — E01.0 THYROMEGALY: ICD-10-CM

## 2023-10-13 DIAGNOSIS — E55.9 VITAMIN D DEFICIENCY: ICD-10-CM

## 2023-10-13 DIAGNOSIS — Z11.4 ENCOUNTER FOR SCREENING FOR HUMAN IMMUNODEFICIENCY VIRUS (HIV): ICD-10-CM

## 2023-10-13 DIAGNOSIS — Z11.59 ENCOUNTER FOR HEPATITIS C SCREENING TEST FOR LOW RISK PATIENT: ICD-10-CM

## 2023-10-13 DIAGNOSIS — E66.09 CLASS 2 OBESITY DUE TO EXCESS CALORIES WITHOUT SERIOUS COMORBIDITY WITH BODY MASS INDEX (BMI) OF 35.0 TO 35.9 IN ADULT: ICD-10-CM

## 2023-10-13 DIAGNOSIS — Z00.00 ANNUAL PHYSICAL EXAM: Primary | ICD-10-CM

## 2023-10-13 PROBLEM — N76.0 BV (BACTERIAL VAGINOSIS): Status: RESOLVED | Noted: 2022-01-11 | Resolved: 2023-10-13

## 2023-10-13 PROBLEM — B37.9 YEAST INFECTION: Status: RESOLVED | Noted: 2023-01-23 | Resolved: 2023-10-13

## 2023-10-13 PROBLEM — Z01.419 ENCOUNTER FOR ANNUAL ROUTINE GYNECOLOGICAL EXAMINATION: Status: RESOLVED | Noted: 2023-01-23 | Resolved: 2023-10-13

## 2023-10-13 PROBLEM — L42 PITYRIASIS ROSEA: Status: RESOLVED | Noted: 2019-09-25 | Resolved: 2023-10-13

## 2023-10-13 PROBLEM — Z00.01 ENCOUNTER FOR ROUTINE ADULT PHYSICAL EXAM WITH ABNORMAL FINDINGS: Status: RESOLVED | Noted: 2022-09-14 | Resolved: 2023-10-13

## 2023-10-13 PROBLEM — B96.89 BV (BACTERIAL VAGINOSIS): Status: RESOLVED | Noted: 2022-01-11 | Resolved: 2023-10-13

## 2023-10-13 PROCEDURE — 99395 PREV VISIT EST AGE 18-39: CPT | Performed by: FAMILY MEDICINE

## 2023-10-13 NOTE — PROGRESS NOTES
ADULT ANNUAL [facility] PRIMARY CARE    NAME: Kaylee Oakley  AGE: 33 y.o. SEX: female  : 1995     DATE: 10/13/2023     Assessment and Plan:     Problem List Items Addressed This Visit          Other    Anxiety    Relevant Orders    Ambulatory Referral to Social Work Care Management Program    Class 2 obesity due to excess calories without serious comorbidity with body mass index (BMI) of 35.0 to 35.9 in adult     Other Visit Diagnoses       Annual physical exam    -  Primary    Relevant Orders    Lipid Panel with Direct LDL reflex    HEMOGLOBIN A1C W/ EAG ESTIMATION    CBC and differential    Comprehensive metabolic panel    Encounter for screening for human immunodeficiency virus (HIV)        Relevant Orders    HIV 1/2 AB/AG w Reflex [facility] for 2 yr old and above    Encounter for hepatitis C screening test for low risk patient        Relevant Orders    Hepatitis C antibody    Vitamin D deficiency        Relevant Orders    Vitamin D 25 hydroxy    Thyromegaly        Relevant Orders    US thyroid          - Will screen for Hepatitis C per USPSTF recommendations to test adults aged 25 to 78 years. - Will screen for HIV per USPSTF recommendations to test individuals aged 15-65 years   - Cervical cancer screening up to date   - Influenza vaccination declined at this time  - Referral to social work placed to connect patient with outpatient mental health resources. Patient also given information for mental health apps such as [employer]a, C[facility]bral and [employer]sen. - Ultrasound thyroid ordered which patient will complete once she gets health insurance   - BMI Counseling: Body mass index is 35.39 kg/m². The BMI is above normal. Nutrition recommendations include decreasing overall calorie intake, 3-5 servings of fruits/vegetables daily, decreasing soda and/or juice intake, moderation in carbohydrate intake, and increasing intake of lean protein.  Exercise recommendations include moderate aerobic physical activity for 150 minutes/week. Immunizations and preventive care screenings were discussed with patient today. Appropriate education was printed on patient's after visit summary. Counseling:  Dental Health: discussed importance of regular tooth brushing, flossing, and dental visits. Sexual health: discussed sexually transmitted diseases, partner selection, use of condoms, avoidance of unintended pregnancy, and contraceptive alternatives. Exercise: the importance of regular exercise/physical activity was discussed. Recommend exercise 3-5 times per week for at least 30 minutes. Depression Screening and Follow-up Plan: Patient was screened for depression during today's encounter. They screened negative with a PHQ-2 score of 2. Return in about 1 year (around 10/13/2024) for Annual physical.     Chief Complaint:     Chief Complaint   Patient presents with    Physical Exam     Patient is trying to conceive and has anxiety concerns      History of Present Illness:     Adult Annual Physical   Ann Machado is a very pleasant 33year old female who presents today for a comprehensive physical exam. She reports a history of anxiety and was previously seeing a psychiatrist who prescribed her Buspar and Klonopin however she is no longer taking these medications. She is interested in therapy instead to help manage her anxiety. Apart from that she feels well. She reports that is trying to conceive. She currently works as a traveling CNA and does not have health insurance at the current moment but hopefully will get it in a few months. Diet and Physical Activity  Diet/Nutrition: poor diet. Exercise: no formal exercise.       Depression Screening  PHQ-2/9 Depression Screening    Little interest or pleasure in doing things: 1 - several days  Feeling down, depressed, or hopeless: 1 - several days  PHQ-2 Score: 2  PHQ-2 Interpretation: Negative depression screen General Health  Sleep: sleeps well. Hearing:  Does not require use of hearing aids . Vision: no vision problems. Dental: no dental visits for >1 year. /GYN Health  Contraceptive method:  None, patient trying to conceive . History of STDs?: yes. Review of Systems:     Review of Systems   Constitutional: Negative. HENT: Negative. Eyes: Negative. Respiratory: Negative. Cardiovascular: Negative. Gastrointestinal: Negative. Genitourinary: Negative. Musculoskeletal: Negative. Skin: Negative. Neurological: Negative. Psychiatric/Behavioral: Negative.         Past Medical History:     Past Medical History:   Diagnosis Date    Anxiety 09/05/2019    AV block, 1st degree     BMI 29.0-29.9,adult 2/24/2021    Chlamydia 2015    COVID-19 virus infection 01/11/2021    Obesity     SOB (shortness of breath) 02/23/2021      Past Surgical History:     Past Surgical History:   Procedure Laterality Date    ELBOW SURGERY Right       Social History:     Social History     Socioeconomic History    Marital status: /Civil Union     Spouse name: None    Number of children: None    Years of education: None    Highest education level: None   Occupational History    None   Tobacco Use    Smoking status: Never    Smokeless tobacco: Never   Vaping Use    Vaping Use: Never used   Substance and Sexual Activity    Alcohol use: Yes     Comment: socially    Drug use: Never    Sexual activity: Yes     Partners: Male     Birth control/protection: None   Other Topics Concern    None   Social History Narrative    None     Social Determinants of Health     Financial Resource Strain: Low Risk  (5/18/2021)    Overall Financial Resource Strain (CARDIA)     Difficulty of Paying Living Expenses: Not hard at all   Food Insecurity: No Food Insecurity (5/18/2021)    Hunger Vital Sign     Worried About Running Out of Food in the Last Year: Never true     Ran Out of Food in the Last Year: Never true Transportation Needs: No Transportation Needs (5/18/2021)    PRAPARE - Transportation     Lack of Transportation (Medical): No     Lack of Transportation (Non-Medical):  No   Physical Activity: Sufficiently Active (5/18/2021)    Exercise Vital Sign     Days of Exercise per Week: 3 days     Minutes of Exercise per Session: 60 min   Stress: Stress Concern Present (9/14/2022)    [facility]     Feeling of Stress : Rather much   Social Connections: Socially Isolated (5/18/2021)    Social Connection and Isolation Panel [NHANES]     Frequency of Communication with Friends and Family: More than three times a week     Frequency of Social Gatherings with Friends and Family: Once a week     Attends Sabianist Services: Never     Active Member of Clubs or Organizations: No     Attends Club or Organization Meetings: Never     Marital Status: Never    Intimate Partner Violence: Not At Risk (5/18/2021)    Humiliation, Afraid, Rape, and Kick questionnaire     Fear of Current or Ex-Partner: No     Emotionally Abused: No     Physically Abused: No     Sexually Abused: No   Housing Stability: Not on file      Family History:     Family History   Problem Relation Age of Onset    Hypertension Father       Current Medications:     Current Outpatient Medications   Medication Sig Dispense Refill    folic acid (FOLVITE) 571 mcg tablet Take 400 mcg by mouth daily      MAGNESIUM PO Take by mouth      Multiple Vitamin (multivitamin) tablet Take 1 tablet by mouth daily      Vitamin D, Cholecalciferol, 50 MCG (2000 UT) CAPS Take 2,000 Int'l Units by mouth daily 30 capsule 1    busPIRone (BUSPAR) 30 MG tablet Take 60 mg by mouth daily (Patient not taking: Reported on 10/13/2023)      clonazePAM (KlonoPIN) 1 mg tablet Take 1 mg by mouth 3 (three) times a day (Patient not taking: Reported on 10/13/2023)      Psyllium (METAMUCIL PO) Take by mouth (Patient not taking: Reported on 10/13/2023)       No current facility-administered medications for this visit. Allergies:     No Known Allergies   Physical Exam:     /66 (BP Location: Left arm, Patient Position: Sitting, Cuff Size: Large)   Pulse 74   Temp 97.8 °F (36.6 °C) (Temporal)   Ht 5' 4" (1.626 m)   Wt 93.5 kg (206 lb 3.2 oz)   SpO2 98%   BMI 35.39 kg/m²     Physical Exam  Constitutional:       General: She is not in acute distress. Appearance: She is not ill-appearing. HENT:      Head: Normocephalic and atraumatic. Mouth/Throat:      Mouth: Mucous membranes are moist.      Pharynx: No oropharyngeal exudate or posterior oropharyngeal erythema. Eyes:      General:         Right eye: No discharge. Left eye: No discharge. Extraocular Movements: Extraocular movements intact. Pupils: Pupils are equal, round, and reactive to light. Neck:      Thyroid: Thyromegaly present. Cardiovascular:      Rate and Rhythm: Normal rate. Pulmonary:      Effort: Pulmonary effort is normal. No respiratory distress. Abdominal:      General: There is no distension. Palpations: Abdomen is soft. Tenderness: There is no abdominal tenderness. Musculoskeletal:      Right lower leg: No edema. Left lower leg: No edema. Neurological:      General: No focal deficit present. Mental Status: She is alert. Motor: No weakness.       Coordination: Coordination normal.      Gait: Gait normal.      Deep Tendon Reflexes: Reflexes normal.   Psychiatric:         Mood and Affect: Mood normal.         Behavior: Behavior normal.          MD Kris Gutierrez

## 2023-10-13 NOTE — PATIENT INSTRUCTIONS
Try looking into [employer], better help or Cerebral mental health apps    Constipation   WHAT YOU NEED TO KNOW:   Constipation means you have hard, dry bowel movements, or you go longer than usual between bowel movements. DISCHARGE INSTRUCTIONS:   Call your doctor if:   You have blood in your bowel movements. You have a fever and abdominal pain with the constipation. Your constipation gets worse. You start to vomit. You have questions or concerns about your condition or care. Medicines:   Medicine  such as a laxative may help relax and loosen your intestines to help you have a bowel movement. Your provider may recommend you only use laxatives for a short time. Long-term use can damage your bowel function over time. Take your medicine as directed. Contact your healthcare provider if you think your medicine is not helping or if you have side effects. Tell your provider if you are allergic to any medicine. Keep a list of the medicines, vitamins, and herbs you take. Include the amounts, and when and why you take them. Bring the list or the pill bottles to follow-up visits. Carry your medicine list with you in case of an emergency. Relieve constipation:   A suppository  may be used to help soften your bowel movements. This may make them easier to pass. A suppository is guided into your rectum through your anus. An enema  is liquid medicine used to clear bowel movement from your rectum. The medicine is put into your rectum through your anus. Prevent constipation:   Drink liquids as directed. You may need to drink extra liquids to help soften and move your bowels. Ask how much liquid to drink each day and which liquids are best for you. Eat high-fiber foods. This may help decrease constipation by adding bulk to your bowel movements. High-fiber foods include fruit, vegetables, whole-grain breads and cereals, and beans.  Your healthcare provider or dietitian can help you create a high-fiber meal plan. Your provider may also recommend a fiber supplement if you cannot get enough fiber from food. Exercise regularly. Regular physical activity can help stimulate your intestines. Walking is a good exercise to prevent or relieve constipation. Ask which exercises are best for you. Schedule a time each day to have a bowel movement. This may help train your body to have regular bowel movements. Bend forward while you are on the toilet to help move the bowel movement out. Sit on the toilet for at least 10 minutes, even if you do not have a bowel movement. Talk to your healthcare provider about your medicines. Certain medicines, such as opioids, can cause constipation. Your provider may be able to make medicine changes. For example, he or she may change the kind of medicine, or change when you take it. Follow up with your doctor as directed:  Write down your questions so you remember to ask them during your visits. © Copyright Pamella Nipple 2023 Information is for End User's use only and may not be sold, redistributed or otherwise used for commercial purposes. The above information is an  only. It is not intended as medical advice for individual conditions or treatments. Talk to your doctor, nurse or pharmacist before following any medical regimen to see if it is safe and effective for you.

## 2023-10-16 ENCOUNTER — PATIENT OUTREACH (OUTPATIENT)
Dept: FAMILY MEDICINE CLINIC | Facility: CLINIC | Age: 28
End: 2023-10-16

## 2023-10-16 NOTE — PROGRESS NOTES
OP CM called to pt today in regards to pt not having insurance and also wanting a therapist.  Email sent to pt as well:    Hello,     I am the  that covers [facility]. I was consulted in regards to insurance and anxiety. Have you applied for Medicaid or marino care through the hospital?  We can assist you with that. Also since you have no health insurance and you are suffering with anxiety you can call [facility] [phone] and go in during walk in hours. Here is the info from their website: If you have NO insurance for outpatient Mental Health services you will need to have a   liability appointment with [facility] to assess you to qualify for funding. [facility] does NOT provide funding for Medications.   WALK IN HOURS:  Monday, Tuesday, Thursday - 9:00am - 11:00am  Wednesday - 9:00am - 11:00am & 1:00pm - 3:00pm  Our address: [facility], [address], [address] [place] [address]   ([facility] building diagonally across from the new [employer] arena-entrance on [address])  Our phone number: [phone]  To be seen during walk in hours, you must brin [address] (if you do not have your SS card, then please bring something else    with your name and address listed on it.)  If you have INCOME and do not have services through medical assistance (which may include   food stamps, OB/GYN coverage, etc.) you must also bring:   Proof of income:   Current paystub from employer   SSD/SSI letter for the current year - or copy of bank statement   Unemployment compensation statement   Interest or dividends from banking accounts  If your net income is over $13,200, you must also bring:   Doctor/Hospital Bills that are paid or unpaid from within the last year   Pharmacy print out of prescriptions paid for the past year   Child support, alimony   [place] tax statement  Sincerely,   [facility]  Department of Adult Mental Health    Also if you wanted to pay privately for therapy you can go on www. psychologytoday. com and search for a private pay provider on this website.

## 2023-10-17 ENCOUNTER — PATIENT OUTREACH (OUTPATIENT)
Dept: FAMILY MEDICINE CLINIC | Facility: CLINIC | Age: 28
End: 2023-10-17

## 2023-10-17 NOTE — PROGRESS NOTES
OP CM called to pt in regards to not having insurance and also struggling with anxiety. Pt picked up the phone and I stated who I was and phone hung up. Also sent pt email with info.

## 2023-11-30 ENCOUNTER — APPOINTMENT (OUTPATIENT)
Dept: LAB | Age: 28
End: 2023-11-30
Payer: COMMERCIAL

## 2023-11-30 DIAGNOSIS — E55.9 VITAMIN D DEFICIENCY: ICD-10-CM

## 2023-11-30 DIAGNOSIS — Z11.59 ENCOUNTER FOR HEPATITIS C SCREENING TEST FOR LOW RISK PATIENT: ICD-10-CM

## 2023-11-30 DIAGNOSIS — Z11.4 ENCOUNTER FOR SCREENING FOR HUMAN IMMUNODEFICIENCY VIRUS (HIV): ICD-10-CM

## 2023-11-30 DIAGNOSIS — Z00.00 ANNUAL PHYSICAL EXAM: ICD-10-CM

## 2023-11-30 DIAGNOSIS — Z11.1 TUBERCULOSIS SCREENING: Primary | ICD-10-CM

## 2023-11-30 LAB
25(OH)D3 SERPL-MCNC: 29.1 NG/ML (ref 30–100)
ALBUMIN SERPL BCP-MCNC: 3.9 G/DL (ref 3.5–5)
ALP SERPL-CCNC: 51 U/L (ref 34–104)
ALT SERPL W P-5'-P-CCNC: 13 U/L (ref 7–52)
ANION GAP SERPL CALCULATED.3IONS-SCNC: 5 MMOL/L
AST SERPL W P-5'-P-CCNC: 20 U/L (ref 13–39)
BASOPHILS # BLD AUTO: 0.02 THOUSANDS/ÂΜL (ref 0–0.1)
BASOPHILS NFR BLD AUTO: 0 % (ref 0–1)
BILIRUB SERPL-MCNC: 0.39 MG/DL (ref 0.2–1)
BUN SERPL-MCNC: 14 MG/DL (ref 5–25)
CALCIUM SERPL-MCNC: 9 MG/DL (ref 8.4–10.2)
CHLORIDE SERPL-SCNC: 105 MMOL/L (ref 96–108)
CHOLEST SERPL-MCNC: 144 MG/DL
CO2 SERPL-SCNC: 28 MMOL/L (ref 21–32)
CREAT SERPL-MCNC: 0.79 MG/DL (ref 0.6–1.3)
EOSINOPHIL # BLD AUTO: 0.07 THOUSAND/ÂΜL (ref 0–0.61)
EOSINOPHIL NFR BLD AUTO: 1 % (ref 0–6)
ERYTHROCYTE [DISTWIDTH] IN BLOOD BY AUTOMATED COUNT: 12.4 % (ref 11.6–15.1)
EST. AVERAGE GLUCOSE BLD GHB EST-MCNC: 108 MG/DL
GFR SERPL CREATININE-BSD FRML MDRD: 102 ML/MIN/1.73SQ M
GLUCOSE P FAST SERPL-MCNC: 83 MG/DL (ref 65–99)
HBA1C MFR BLD: 5.4 %
HCT VFR BLD AUTO: 39.1 % (ref 34.8–46.1)
HCV AB SER QL: NORMAL
HDLC SERPL-MCNC: 60 MG/DL
HGB BLD-MCNC: 12.6 G/DL (ref 11.5–15.4)
IMM GRANULOCYTES # BLD AUTO: 0.01 THOUSAND/UL (ref 0–0.2)
IMM GRANULOCYTES NFR BLD AUTO: 0 % (ref 0–2)
LDLC SERPL CALC-MCNC: 76 MG/DL (ref 0–100)
LYMPHOCYTES # BLD AUTO: 1.74 THOUSANDS/ÂΜL (ref 0.6–4.47)
LYMPHOCYTES NFR BLD AUTO: 32 % (ref 14–44)
MCH RBC QN AUTO: 30.1 PG (ref 26.8–34.3)
MCHC RBC AUTO-ENTMCNC: 32.2 G/DL (ref 31.4–37.4)
MCV RBC AUTO: 93 FL (ref 82–98)
MONOCYTES # BLD AUTO: 0.32 THOUSAND/ÂΜL (ref 0.17–1.22)
MONOCYTES NFR BLD AUTO: 6 % (ref 4–12)
NEUTROPHILS # BLD AUTO: 3.35 THOUSANDS/ÂΜL (ref 1.85–7.62)
NEUTS SEG NFR BLD AUTO: 61 % (ref 43–75)
NRBC BLD AUTO-RTO: 0 /100 WBCS
PLATELET # BLD AUTO: 200 THOUSANDS/UL (ref 149–390)
PMV BLD AUTO: 11.3 FL (ref 8.9–12.7)
POTASSIUM SERPL-SCNC: 4 MMOL/L (ref 3.5–5.3)
PROT SERPL-MCNC: 6.7 G/DL (ref 6.4–8.4)
RBC # BLD AUTO: 4.19 MILLION/UL (ref 3.81–5.12)
SODIUM SERPL-SCNC: 138 MMOL/L (ref 135–147)
TRIGL SERPL-MCNC: 41 MG/DL
WBC # BLD AUTO: 5.51 THOUSAND/UL (ref 4.31–10.16)

## 2023-11-30 PROCEDURE — 83036 HEMOGLOBIN GLYCOSYLATED A1C: CPT

## 2023-11-30 PROCEDURE — 86803 HEPATITIS C AB TEST: CPT

## 2023-11-30 PROCEDURE — 36415 COLL VENOUS BLD VENIPUNCTURE: CPT

## 2023-11-30 PROCEDURE — 80061 LIPID PANEL: CPT

## 2023-11-30 PROCEDURE — 82306 VITAMIN D 25 HYDROXY: CPT

## 2023-11-30 PROCEDURE — 85025 COMPLETE CBC W/AUTO DIFF WBC: CPT

## 2023-11-30 PROCEDURE — 87389 HIV-1 AG W/HIV-1&-2 AB AG IA: CPT

## 2023-11-30 PROCEDURE — 80053 COMPREHEN METABOLIC PANEL: CPT

## 2023-12-01 ENCOUNTER — APPOINTMENT (OUTPATIENT)
Dept: LAB | Age: 28
End: 2023-12-01
Payer: COMMERCIAL

## 2023-12-01 DIAGNOSIS — Z11.1 TUBERCULOSIS SCREENING: ICD-10-CM

## 2023-12-01 LAB
HIV 1+2 AB+HIV1 P24 AG SERPL QL IA: NORMAL
HIV 2 AB SERPL QL IA: NORMAL
HIV1 AB SERPL QL IA: NORMAL
HIV1 P24 AG SERPL QL IA: NORMAL

## 2023-12-01 PROCEDURE — 36415 COLL VENOUS BLD VENIPUNCTURE: CPT

## 2023-12-01 PROCEDURE — 86480 TB TEST CELL IMMUN MEASURE: CPT

## 2023-12-02 LAB
GAMMA INTERFERON BACKGROUND BLD IA-ACNC: <0 IU/ML
M TB IFN-G BLD-IMP: NEGATIVE
M TB IFN-G CD4+ BCKGRND COR BLD-ACNC: 0.01 IU/ML
M TB IFN-G CD4+ BCKGRND COR BLD-ACNC: 0.01 IU/ML
MITOGEN IGNF BCKGRD COR BLD-ACNC: 10 IU/ML

## 2023-12-03 ENCOUNTER — HOSPITAL ENCOUNTER (OUTPATIENT)
Dept: ULTRASOUND IMAGING | Facility: HOSPITAL | Age: 28
Discharge: HOME | End: 2023-12-03
Attending: FAMILY MEDICINE
Payer: COMMERCIAL

## 2023-12-03 DIAGNOSIS — E01.0 THYROMEGALY: ICD-10-CM

## 2023-12-03 PROCEDURE — 76536 US EXAM OF HEAD AND NECK: CPT

## 2023-12-08 ENCOUNTER — OFFICE VISIT (OUTPATIENT)
Dept: FAMILY MEDICINE CLINIC | Facility: CLINIC | Age: 28
End: 2023-12-08
Payer: COMMERCIAL

## 2023-12-08 VITALS
WEIGHT: 208.4 LBS | OXYGEN SATURATION: 98 % | SYSTOLIC BLOOD PRESSURE: 120 MMHG | HEIGHT: 64 IN | HEART RATE: 72 BPM | TEMPERATURE: 97.8 F | DIASTOLIC BLOOD PRESSURE: 72 MMHG | BODY MASS INDEX: 35.58 KG/M2

## 2023-12-08 DIAGNOSIS — F41.9 ANXIETY: Primary | ICD-10-CM

## 2023-12-08 PROCEDURE — 99214 OFFICE O/P EST MOD 30 MIN: CPT | Performed by: FAMILY MEDICINE

## 2023-12-08 RX ORDER — BUPROPION HYDROCHLORIDE 150 MG/1
150 TABLET ORAL EVERY MORNING
Qty: 30 TABLET | Refills: 2 | Status: SHIPPED | OUTPATIENT
Start: 2023-12-08

## 2023-12-08 NOTE — PROGRESS NOTES
Assessment/Plan:    No problem-specific Assessment & Plan notes found for this encounter. Diagnoses and all orders for this visit:    Anxiety  -     buPROPion (Wellbutrin XL) 150 mg 24 hr tablet; Take 1 tablet (150 mg total) by mouth every morning    Other orders  -     Prenatal Vit-Iron Carbonyl-FA (prenatal multivitamin) TABS        - Most recent labs and thyroid ultrasound reviewed. Vitamin D slightly low and patient advised to restart vitamin D 2000 units daily. Thyroid isthmus top normal/slightly enlarged but normal thyroid nodules. - Will start a trial of Wellbutrin 150 mg daily for anxiety and depression. Patient advised to contact list of mental health resources. - Follow up in 4 -6 weeks   Subjective:      Patient ID: Kaylee Oakley is a 33 y.o. female. HPI  Kaylee Oakley is a very pleasant 33year old female with a past medical history of anxiety and depression who presents today for a follow up and to review recent labs. Patient's main concern today is regarding her anxiety. She is currently taking hydroxyzine as needed although she cannot remember the dose but thinks that she needs to be on an antidepressant. Patient has been on SSRI's in the past which have decreased her sex drive and patient is trying to conceive with her . She is still interested in therapy. Social work did reach out to her regarding a list of mental health resources. Now that she has health insurance she will enquire about this. The following portions of the patient's history were reviewed and updated as appropriate: allergies, current medications, past family history, past medical history, past social history, past surgical history, and problem list.    Review of Systems   Constitutional: Negative. HENT: Negative. Eyes: Negative. Respiratory: Negative. Cardiovascular: Negative. Gastrointestinal: Negative. Genitourinary: Negative. Musculoskeletal: Negative. Skin: Negative. Neurological: Negative. Psychiatric/Behavioral:  The patient is nervous/anxious. Objective:      /72 (BP Location: Left arm, Patient Position: Sitting, Cuff Size: Large)   Pulse 72   Temp 97.8 °F (36.6 °C) (Temporal)   Ht 5' 4" (1.626 m)   Wt 94.5 kg (208 lb 6.4 oz)   SpO2 98%   BMI 35.77 kg/m²          Physical Exam  Constitutional:       General: She is not in acute distress. Appearance: She is not ill-appearing. HENT:      Head: Normocephalic and atraumatic. Eyes:      General:         Right eye: No discharge. Left eye: No discharge. Extraocular Movements: Extraocular movements intact. Cardiovascular:      Rate and Rhythm: Normal rate. Neurological:      General: No focal deficit present. Mental Status: She is alert.    Psychiatric:         Mood and Affect: Mood normal.         Behavior: Behavior normal.

## 2024-03-15 ENCOUNTER — APPOINTMENT (OUTPATIENT)
Dept: URGENT CARE | Age: 29
End: 2024-03-15

## 2024-04-03 DIAGNOSIS — H93.13 TINNITUS OF BOTH EARS: Primary | ICD-10-CM

## 2024-04-07 DIAGNOSIS — R00.2 PALPITATIONS: Primary | ICD-10-CM

## 2024-04-07 DIAGNOSIS — Z01.84 IMMUNITY STATUS TESTING: ICD-10-CM

## 2024-04-09 ENCOUNTER — LAB (OUTPATIENT)
Dept: LAB | Age: 29
End: 2024-04-09
Payer: COMMERCIAL

## 2024-04-09 ENCOUNTER — TELEPHONE (OUTPATIENT)
Age: 29
End: 2024-04-09

## 2024-04-09 DIAGNOSIS — Z01.84 IMMUNITY STATUS TESTING: ICD-10-CM

## 2024-04-09 DIAGNOSIS — R00.2 PALPITATIONS: ICD-10-CM

## 2024-04-09 LAB
ALBUMIN SERPL BCP-MCNC: 3.6 G/DL (ref 3.5–5)
ALP SERPL-CCNC: 46 U/L (ref 34–104)
ALT SERPL W P-5'-P-CCNC: 12 U/L (ref 7–52)
ANION GAP SERPL CALCULATED.3IONS-SCNC: 8 MMOL/L (ref 4–13)
AST SERPL W P-5'-P-CCNC: 15 U/L (ref 13–39)
BASOPHILS # BLD AUTO: 0.02 THOUSANDS/ÂΜL (ref 0–0.1)
BASOPHILS NFR BLD AUTO: 0 % (ref 0–1)
BILIRUB SERPL-MCNC: 0.48 MG/DL (ref 0.2–1)
BUN SERPL-MCNC: 13 MG/DL (ref 5–25)
CALCIUM SERPL-MCNC: 9.4 MG/DL (ref 8.4–10.2)
CHLORIDE SERPL-SCNC: 102 MMOL/L (ref 96–108)
CO2 SERPL-SCNC: 27 MMOL/L (ref 21–32)
CREAT SERPL-MCNC: 0.8 MG/DL (ref 0.6–1.3)
EOSINOPHIL # BLD AUTO: 0.08 THOUSAND/ÂΜL (ref 0–0.61)
EOSINOPHIL NFR BLD AUTO: 1 % (ref 0–6)
ERYTHROCYTE [DISTWIDTH] IN BLOOD BY AUTOMATED COUNT: 12 % (ref 11.6–15.1)
GFR SERPL CREATININE-BSD FRML MDRD: 101 ML/MIN/1.73SQ M
GLUCOSE P FAST SERPL-MCNC: 85 MG/DL (ref 65–99)
HBV SURFACE AB SER-ACNC: 50.5 MIU/ML
HCT VFR BLD AUTO: 39.6 % (ref 34.8–46.1)
HGB BLD-MCNC: 13.7 G/DL (ref 11.5–15.4)
IMM GRANULOCYTES # BLD AUTO: 0.01 THOUSAND/UL (ref 0–0.2)
IMM GRANULOCYTES NFR BLD AUTO: 0 % (ref 0–2)
LYMPHOCYTES # BLD AUTO: 2.22 THOUSANDS/ÂΜL (ref 0.6–4.47)
LYMPHOCYTES NFR BLD AUTO: 36 % (ref 14–44)
MAGNESIUM SERPL-MCNC: 1.6 MG/DL (ref 1.9–2.7)
MCH RBC QN AUTO: 31.1 PG (ref 26.8–34.3)
MCHC RBC AUTO-ENTMCNC: 34.6 G/DL (ref 31.4–37.4)
MCV RBC AUTO: 90 FL (ref 82–98)
MONOCYTES # BLD AUTO: 0.37 THOUSAND/ÂΜL (ref 0.17–1.22)
MONOCYTES NFR BLD AUTO: 6 % (ref 4–12)
NEUTROPHILS # BLD AUTO: 3.48 THOUSANDS/ÂΜL (ref 1.85–7.62)
NEUTS SEG NFR BLD AUTO: 57 % (ref 43–75)
NRBC BLD AUTO-RTO: 0 /100 WBCS
PLATELET # BLD AUTO: 254 THOUSANDS/UL (ref 149–390)
PMV BLD AUTO: 10 FL (ref 8.9–12.7)
POTASSIUM SERPL-SCNC: 3.7 MMOL/L (ref 3.5–5.3)
PROT SERPL-MCNC: 7.2 G/DL (ref 6.4–8.4)
RBC # BLD AUTO: 4.4 MILLION/UL (ref 3.81–5.12)
SODIUM SERPL-SCNC: 137 MMOL/L (ref 135–147)
TSH SERPL DL<=0.05 MIU/L-ACNC: 1.11 UIU/ML (ref 0.45–4.5)
WBC # BLD AUTO: 6.18 THOUSAND/UL (ref 4.31–10.16)

## 2024-04-09 PROCEDURE — 86706 HEP B SURFACE ANTIBODY: CPT

## 2024-04-09 PROCEDURE — 84443 ASSAY THYROID STIM HORMONE: CPT

## 2024-04-09 PROCEDURE — 80053 COMPREHEN METABOLIC PANEL: CPT

## 2024-04-09 PROCEDURE — 36415 COLL VENOUS BLD VENIPUNCTURE: CPT

## 2024-04-09 PROCEDURE — 85025 COMPLETE CBC W/AUTO DIFF WBC: CPT

## 2024-04-09 PROCEDURE — 83735 ASSAY OF MAGNESIUM: CPT

## 2024-04-09 NOTE — TELEPHONE ENCOUNTER
Patient calling requesting HepB vaccine.    Patient canceled 4/4/24 visit.  Patient informed to have her blood work done first and schedule an office visit to discuss results and vaccination.    Patient requesting Thursday afternoon visit.   None available.    Please review and  advice.  Thank you

## 2024-04-12 ENCOUNTER — OFFICE VISIT (OUTPATIENT)
Dept: FAMILY MEDICINE CLINIC | Facility: CLINIC | Age: 29
End: 2024-04-12
Payer: COMMERCIAL

## 2024-04-12 VITALS
HEART RATE: 76 BPM | WEIGHT: 202 LBS | BODY MASS INDEX: 33.66 KG/M2 | OXYGEN SATURATION: 97 % | SYSTOLIC BLOOD PRESSURE: 111 MMHG | DIASTOLIC BLOOD PRESSURE: 55 MMHG | HEIGHT: 65 IN | TEMPERATURE: 97.6 F

## 2024-04-12 DIAGNOSIS — K59.04 CHRONIC IDIOPATHIC CONSTIPATION: Primary | ICD-10-CM

## 2024-04-12 PROCEDURE — 99214 OFFICE O/P EST MOD 30 MIN: CPT | Performed by: FAMILY MEDICINE

## 2024-04-12 RX ORDER — DICYCLOMINE HYDROCHLORIDE 10 MG/1
10 CAPSULE ORAL
Qty: 90 CAPSULE | Refills: 1 | Status: SHIPPED | OUTPATIENT
Start: 2024-04-12

## 2024-04-12 RX ORDER — MAGNESIUM OXIDE/MAG AA CHELATE 300 MG
CAPSULE ORAL
COMMUNITY
Start: 2024-04-12

## 2024-04-12 RX ORDER — POLYETHYLENE GLYCOL 3350 17 G/17G
17 POWDER, FOR SOLUTION ORAL DAILY
Qty: 30 EACH | Refills: 2 | Status: SHIPPED | OUTPATIENT
Start: 2024-04-12

## 2024-04-12 NOTE — PROGRESS NOTES
Assessment/Plan:    No problem-specific Assessment & Plan notes found for this encounter.       Diagnoses and all orders for this visit:    Chronic idiopathic constipation  -     dicyclomine (BENTYL) 10 mg capsule; Take 1 capsule (10 mg total) by mouth 3 (three) times a day before meals  -     polyethylene glycol (MIRALAX) 17 g packet; Take 17 g by mouth daily    Other orders  -     Magnesium 300 MG CAPS        - Patient with presenting with chronic constipation with abdominal cramping for a year. No red flags on history and on recent lab work no anemia was noted. Discussed increasing fluid intake, increase soluble fiber in the diet, regular exercise, using bulking agents such as metamucil and will also prescribe Miralax. Patient may also benefit from keeping a food diary and avoiding foods containing gluten or adhering to a low FODMAP diet. Will also start a trial of Bentyl for abdominal cramping. If no improvement recommend evaluation by Gastroenterology.      Subjective:      Patient ID: Vivi Isabel is a 28 y.o. female.    HPI    Vivi Isabel is a 28 year old female who presents today cocerned that she may have IBS. Patient states that she has been having recurrent abdominal pain and constipation for at least a year. She has been using 'Inno Cleanse' supplements and states that when she uses those supplements she can go every other day but without them she typically goes every 3 days and her stools are hard and small. She denies any blood or mucus in the stool, family history of colon cancer or inflammatory bowel disease. She is unaware of any relation to food. She states that she does her best to keep hydrated but does not eat a lot of fruits and vegetables.     The following portions of the patient's history were reviewed and updated as appropriate: allergies, current medications, past family history, past medical history, past social history, past surgical history, and problem list.    Review of Systems  "  Constitutional: Negative.    HENT: Negative.     Eyes: Negative.    Respiratory: Negative.     Cardiovascular: Negative.    Gastrointestinal:  Positive for abdominal pain and constipation. Negative for anal bleeding, blood in stool, diarrhea, nausea and vomiting.   Genitourinary: Negative.    Musculoskeletal: Negative.    Skin: Negative.    Neurological: Negative.    Psychiatric/Behavioral: Negative.           Objective:      /55 (BP Location: Left arm, Patient Position: Sitting, Cuff Size: Large)   Pulse 76   Temp 97.6 °F (36.4 °C) (Temporal)   Ht 5' 5.25\" (1.657 m)   Wt 91.6 kg (202 lb)   LMP 03/27/2024   SpO2 97%   BMI 33.36 kg/m²          Physical Exam  Constitutional:       General: She is not in acute distress.     Appearance: She is not ill-appearing.   HENT:      Head: Normocephalic and atraumatic.   Eyes:      General:         Right eye: No discharge.         Left eye: No discharge.      Extraocular Movements: Extraocular movements intact.   Cardiovascular:      Rate and Rhythm: Normal rate.   Pulmonary:      Effort: Pulmonary effort is normal. No respiratory distress.      Breath sounds: No wheezing.   Abdominal:      General: Bowel sounds are normal. There is no distension.      Palpations: Abdomen is soft.      Tenderness: There is no abdominal tenderness. There is no guarding.   Neurological:      General: No focal deficit present.      Mental Status: She is alert.   Psychiatric:         Mood and Affect: Mood normal.         Behavior: Behavior normal.           "

## 2024-04-30 ENCOUNTER — PATIENT MESSAGE (OUTPATIENT)
Dept: FAMILY MEDICINE CLINIC | Facility: CLINIC | Age: 29
End: 2024-04-30

## 2024-05-02 ENCOUNTER — APPOINTMENT (OUTPATIENT)
Dept: LAB | Facility: CLINIC | Age: 29
End: 2024-05-02
Payer: COMMERCIAL

## 2024-05-02 DIAGNOSIS — R00.2 PALPITATIONS: ICD-10-CM

## 2024-05-02 LAB
ATRIAL RATE: 61 BPM
P AXIS: 25 DEGREES
PR INTERVAL: 196 MS
QRS AXIS: 80 DEGREES
QRSD INTERVAL: 94 MS
QT INTERVAL: 412 MS
QTC INTERVAL: 414 MS
T WAVE AXIS: 53 DEGREES
VENTRICULAR RATE: 61 BPM

## 2024-05-02 PROCEDURE — 93010 ELECTROCARDIOGRAM REPORT: CPT | Performed by: INTERNAL MEDICINE

## 2024-05-02 PROCEDURE — 93005 ELECTROCARDIOGRAM TRACING: CPT

## 2024-05-09 ENCOUNTER — RA CDI HCC (OUTPATIENT)
Dept: OTHER | Facility: HOSPITAL | Age: 29
End: 2024-05-09

## 2024-05-20 ENCOUNTER — OFFICE VISIT (OUTPATIENT)
Dept: FAMILY MEDICINE CLINIC | Facility: CLINIC | Age: 29
End: 2024-05-20
Payer: COMMERCIAL

## 2024-05-20 VITALS
HEART RATE: 67 BPM | HEIGHT: 66 IN | BODY MASS INDEX: 31.79 KG/M2 | SYSTOLIC BLOOD PRESSURE: 113 MMHG | TEMPERATURE: 96.7 F | OXYGEN SATURATION: 94 % | DIASTOLIC BLOOD PRESSURE: 63 MMHG | WEIGHT: 197.8 LBS

## 2024-05-20 DIAGNOSIS — M89.9 LUMP OF RIB: Primary | ICD-10-CM

## 2024-05-20 PROCEDURE — 99213 OFFICE O/P EST LOW 20 MIN: CPT | Performed by: FAMILY MEDICINE

## 2024-05-20 NOTE — PROGRESS NOTES
Ambulatory Visit  Name: Vivi Isabel      : 1995      MRN: [id number]  Encounter Provider: Omayra Shirley MD  Encounter Date: 2024   Encounter department: [facility]    Assessment & Plan   1. Lump of rib  Comments:  XR unremarkable, may represent chondroma. If symptoms persist consider ultrasound or CT       History of Present Illness     Vivi Isabel is a 28 year old female who presents today for a follow up after being seen in the Emergency department complaining of a painful palpable mass on the left side of her chest wall. She denied any trauma prior to the onset of the symptoms. She also states that she earlier that day she had a panic attack and had been feeling chest pain and shortness of breath. In the ED she had an XR of the ribs which was unremarkable. Today she denies any pain at the site as well as any chest pain or shortness of breath.      Review of Systems   Constitutional: Negative.    HENT: Negative.     Eyes: Negative.    Respiratory: Negative.     Cardiovascular: Negative.    Gastrointestinal: Negative.    Genitourinary: Negative.    Musculoskeletal: Negative.    Skin: Negative.    Neurological: Negative.    Psychiatric/Behavioral: Negative.       Current Outpatient Medications on File Prior to Visit   Medication Sig Dispense Refill    Prenatal Vit-Iron Carbonyl-FA (prenatal multivitamin) TABS       Cholecalciferol (Vitamin D) 125 MCG (5000 UT) CAPS Take 5,000 Units by mouth daily      dicyclomine (BENTYL) 10 mg capsule Take 1 capsule (10 mg total) by mouth 3 (three) times a day before meals 90 capsule 1    Magnesium 300 MG CAPS       polyethylene glycol (MIRALAX) 17 g packet Take 17 g by mouth daily 30 each 2     No current facility-administered medications on file prior to visit.      Social History     Tobacco Use    Smoking status: Never    Smokeless tobacco: Never   Vaping Use    Vaping status: Never Used   Substance and Sexual Activity    Alcohol use: Not  "Currently     Comment: socially    Drug use: Never    Sexual activity: Yes     Partners: Male     Birth control/protection: None     Objective     /63 (BP Location: Left arm, Patient Position: Sitting, Cuff Size: Large)   Pulse 67   Temp (!) 96.7 °F (35.9 °C) (Temporal)   Ht 5' 5.5\" (1.664 m)   Wt 89.7 kg (197 lb 12.8 oz)   SpO2 94%   BMI 32.42 kg/m²     Physical Exam  Constitutional:       General: She is not in acute distress.     Appearance: She is not ill-appearing.   HENT:      Head: Normocephalic and atraumatic.   Eyes:      General:         Right eye: No discharge.         Left eye: No discharge.      Extraocular Movements: Extraocular movements intact.   Cardiovascular:      Rate and Rhythm: Normal rate.   Pulmonary:      Effort: Pulmonary effort is normal. No respiratory distress.      Breath sounds: No wheezing.   Chest:      Chest wall: Mass (Left sided, non tender to palpation) present.   Neurological:      General: No focal deficit present.      Mental Status: She is alert.   Psychiatric:         Mood and Affect: Mood normal.         Behavior: Behavior normal.           "

## 2024-06-04 DIAGNOSIS — R22.2 CHEST WALL MASS: Primary | ICD-10-CM

## 2024-06-19 ENCOUNTER — HOSPITAL ENCOUNTER (OUTPATIENT)
Dept: RADIOLOGY | Age: 29
Discharge: HOME | End: 2024-06-19
Payer: COMMERCIAL

## 2024-06-19 DIAGNOSIS — K59.04 CHRONIC IDIOPATHIC CONSTIPATION: Primary | ICD-10-CM

## 2024-06-19 DIAGNOSIS — R22.2 CHEST WALL MASS: ICD-10-CM

## 2024-06-19 PROCEDURE — 71260 CT THORAX DX C+: CPT

## 2024-06-19 RX ADMIN — IOHEXOL 85 ML: 350 INJECTION, SOLUTION INTRAVENOUS at 13:53

## 2024-06-26 ENCOUNTER — OFFICE VISIT (OUTPATIENT)
Dept: FAMILY MEDICINE CLINIC | Facility: CLINIC | Age: 29
End: 2024-06-26
Payer: COMMERCIAL

## 2024-06-26 VITALS
SYSTOLIC BLOOD PRESSURE: 116 MMHG | OXYGEN SATURATION: 96 % | HEIGHT: 66 IN | BODY MASS INDEX: 31.23 KG/M2 | TEMPERATURE: 97.2 F | DIASTOLIC BLOOD PRESSURE: 64 MMHG | HEART RATE: 76 BPM | WEIGHT: 194.3 LBS

## 2024-06-26 DIAGNOSIS — Z12.4 SCREENING FOR CERVICAL CANCER: ICD-10-CM

## 2024-06-26 DIAGNOSIS — Z71.2 ENCOUNTER TO DISCUSS TEST RESULTS: Primary | ICD-10-CM

## 2024-06-26 DIAGNOSIS — Q24.8 PERICARDIAL CYST: ICD-10-CM

## 2024-06-26 PROCEDURE — 99213 OFFICE O/P EST LOW 20 MIN: CPT | Performed by: FAMILY MEDICINE

## 2024-06-26 NOTE — PATIENT INSTRUCTIONS
Recommended the following vulvar hygiene measures:  Avoid scented products (pads, tampons, soaps, detergents, perfumes, sprays, bubble baths, wipes)  Avoid unnecessary prolonged usage of pads  Wear cotton underwear and avoid tight fitting pants/tights/leotards  Change out of damp exercise clothes and swimsuits as soon as possible  Sleep without underwear  No douching  If you use the Always brand pad, try switching to another brand or tampons instead - go with all cotton/natural products such as [employer] Generation and Natracare  Do not use a washcloth or soap directly on the vagina
What Type Of Note Output Would You Prefer (Optional)?: Bullet Format
Hpi Title: Evaluation of Skin Lesions

## 2024-06-26 NOTE — PROGRESS NOTES
Ambulatory Visit  Name: Vivi Isabel      : 1995      MRN: [id number]  Encounter Provider: Omayra Shirley MD  Encounter Date: 2024   Encounter department: [facility]    Assessment & Plan   1. Encounter to discuss test results  2. Pericardial cyst  3. Screening for cervical cancer  -     Ambulatory Referral to Obstetrics / Gynecology; Future  - CT scan results reviewed; abutting the right mediastinal margin and portion of the heart there is a water density structure measuring 3.3 x 2.0 cm; favoring pericardial cyst. No enlarged adenopathy. Advised that these are generally benign however if it becomes problematic it may require surgical intervention.  - Per chart review las pap smear was in 2021; patient will need repeat pap in 2024; referral to GYN placed  - Follow up as needed     History of Present Illness     Vivi Isabel is a 28 year old female who presents today to discuss recent test results. Patient recently underwent a CT scan for mass on her chest which showed a cystic structure favoring a pericardial cyst. She states that sometimes when she exercises it hurts but generally it is not bothersome to her. She is also requesting a referral to gynecology.     Review of Systems   Constitutional: Negative.    HENT: Negative.     Eyes: Negative.    Respiratory: Negative.     Cardiovascular: Negative.    Gastrointestinal: Negative.    Genitourinary: Negative.    Musculoskeletal: Negative.    Skin: Negative.    Neurological: Negative.    Psychiatric/Behavioral: Negative.       Current Outpatient Medications on File Prior to Visit   Medication Sig Dispense Refill    NON FORMULARY Zenium      Prenatal Vit-Iron Carbonyl-FA (prenatal multivitamin) TABS       Cholecalciferol (Vitamin D) 125 MCG (5000 UT) CAPS Take 5,000 Units by mouth daily      dicyclomine (BENTYL) 10 mg capsule Take 1 capsule (10 mg total) by mouth 3 (three) times a day before meals 90 capsule 1     "Magnesium 300 MG CAPS       polyethylene glycol (MIRALAX) 17 g packet Take 17 g by mouth daily 30 each 2     No current facility-administered medications on file prior to visit.      Social History     Tobacco Use    Smoking status: Never    Smokeless tobacco: Never   Vaping Use    Vaping status: Never Used   Substance and Sexual Activity    Alcohol use: Not Currently     Comment: socially    Drug use: Never    Sexual activity: Yes     Partners: Male     Birth control/protection: None     Objective     /64 (BP Location: Left arm, Patient Position: Sitting, Cuff Size: Adult)   Pulse 76   Temp (!) 97.2 °F (36.2 °C) (Temporal)   Ht 5' 5.5\" (1.664 m)   Wt 88.1 kg (194 lb 4.8 oz)   SpO2 96%   BMI 31.84 kg/m²     Physical Exam  Constitutional:       General: She is not in acute distress.     Appearance: She is not ill-appearing.   HENT:      Head: Normocephalic and atraumatic.   Eyes:      General:         Right eye: No discharge.         Left eye: No discharge.      Extraocular Movements: Extraocular movements intact.   Cardiovascular:      Rate and Rhythm: Normal rate.   Pulmonary:      Effort: Pulmonary effort is normal. No respiratory distress.      Breath sounds: No wheezing.   Chest:      Chest wall: Mass present.      Comments: Mass (Left sided, non tender to palpation)  Neurological:      General: No focal deficit present.      Mental Status: She is alert.   Psychiatric:         Mood and Affect: Mood normal.         Behavior: Behavior normal.       Administrative Statements           "

## 2024-07-02 ENCOUNTER — TELEPHONE (OUTPATIENT)
Dept: FAMILY MEDICINE CLINIC | Facility: CLINIC | Age: 29
End: 2024-07-02

## 2024-07-23 ENCOUNTER — OFFICE VISIT (OUTPATIENT)
Dept: GASTROENTEROLOGY | Facility: MEDICAL CENTER | Age: 29
End: 2024-07-23
Payer: COMMERCIAL

## 2024-07-23 VITALS
HEART RATE: 72 BPM | HEIGHT: 66 IN | BODY MASS INDEX: 31.47 KG/M2 | WEIGHT: 195.8 LBS | DIASTOLIC BLOOD PRESSURE: 76 MMHG | TEMPERATURE: 98.5 F | OXYGEN SATURATION: 98 % | SYSTOLIC BLOOD PRESSURE: 116 MMHG

## 2024-07-23 DIAGNOSIS — K59.04 CHRONIC IDIOPATHIC CONSTIPATION: ICD-10-CM

## 2024-07-23 PROCEDURE — 99203 OFFICE O/P NEW LOW 30 MIN: CPT | Performed by: NURSE PRACTITIONER

## 2024-07-23 NOTE — PROGRESS NOTES
[facility] - Outpatient Consultation  Vivi Isabel 28 y.o. female MRN: [id number]  Encounter: [id number]          ASSESSMENT AND PLAN:    Vivi Isabel is a 28 y.o. female who presents with complaint of constipation.    1. Chronic idiopathic constipation    Started with chronic constipation approximately 1 year ago.  Reports BMs are approximately every other day to every 2 days.  She does use a laxative as needed.  She did get relief with MiraLAX 1 capful daily but she stopped it after 1 week.  Denies any melena or hematochezia.  She does get lower abdominal cramping that is better with a BM.  No weight loss.  No new medications.  Recent CBC, CMP and TSH are normal.    -Fiber supplement daily  -High-fiber diet  -MiraLAX 1 capful daily  -Follow-up in office 2 to 3 months or sooner if needed       _____________________________________________________________________    HPI:    Vivi Isabel is a 28 y.o. female who presents with complaint of constipation.  She has past medical history of chronic constipation.    Started with chronic constipation approximately 1 year ago.  Reports BMs are approximately every other day to every 2 days.  She does use a laxative as needed.  She did get relief with MiraLAX 1 capful daily but she stopped it after 1 week.  Denies any melena or hematochezia.  She does get lower abdominal cramping that is better with a BM.  No weight loss.  No new medications.  Recent CBC, CMP and TSH are normal.      Labs 4/24-CMP normal other than magnesium 1.6, CBC normal, TSH normal.    CT of chest 6/24-no chest wall mass or rib lesion identified.  Cystic structure abutting the mediastinum and right heart border favored to represent a pericardial cyst.      Previous EGD/colonoscopy      REVIEW OF SYSTEMS:    CONSTITUTIONAL: Denies any fever, chills, rigors, and weight loss.  HEENT: No earache or tinnitus. Denies hearing loss or visual disturbances.  CARDIOVASCULAR: No chest  "pain or palpitations.   RESPIRATORY: Denies any cough, hemoptysis, shortness of breath or dyspnea on exertion.  GASTROINTESTINAL: As noted in the History of Present Illness.   GENITOURINARY: No problems with urination. Denies any hematuria or dysuria.  NEUROLOGIC: No dizziness or vertigo, denies headaches.   MUSCULOSKELETAL: Denies any muscle or joint pain.   SKIN: Denies skin rashes or itching.   ENDOCRINE: Denies excessive thirst. Denies intolerance to heat or cold.  PSYCHOSOCIAL: Denies depression or anxiety. Denies any recent memory loss.       Historical Information   Past Medical History:   Diagnosis Date   • Anxiety 09/05/2019   • AV block, 1st degree    • BMI 29.0-29.9,adult 2/24/2021   • Chlamydia 2015   • COVID-19 virus infection 01/11/2021   • Obesity    • SOB (shortness of breath) 02/23/2021     Past Surgical History:   Procedure Laterality Date   • ELBOW SURGERY Right    • SURGERY OF LIP      Piercing removal, pt reported     Social History   Social History     Substance and Sexual Activity   Alcohol Use Yes    Comment: socially     Social History     Substance and Sexual Activity   Drug Use Never     Social History     Tobacco Use   Smoking Status Never   Smokeless Tobacco Never     Family History   Problem Relation Age of Onset   • Hypertension Father        Meds/Allergies       Current Outpatient Medications:   •  Cholecalciferol (Vitamin D) 125 MCG (5000 UT) CAPS  •  NON FORMULARY  •  Prenatal Vit-Iron Carbonyl-FA (prenatal multivitamin) TABS  •  dicyclomine (BENTYL) 10 mg capsule  •  Magnesium 300 MG CAPS  •  polyethylene glycol (MIRALAX) 17 g packet    No Known Allergies        Objective     Blood pressure 116/76, pulse 72, temperature 98.5 °F (36.9 °C), temperature source Tympanic, height 5' 6\" (1.676 m), weight 88.8 kg (195 lb 12.8 oz), SpO2 98%, not currently breastfeeding. Body mass index is 31.6 kg/m².        PHYSICAL EXAM:      General Appearance:   Alert, cooperative, no distress   HEENT:   " "Normocephalic, atraumatic, anicteric.     Neck:  Supple, symmetrical, trachea midline   Lungs:   Clear to auscultation bilaterally; no rales, rhonchi or wheezing; respirations unlabored    Heart::   Regular rate and rhythm; no murmur, rub, or gallop.   Abdomen:   Soft, non-tender, non-distended; normal bowel sounds; no masses, no organomegaly    Genitalia:   Deferred    Rectal:   Deferred    Extremities:  No cyanosis, clubbing or edema    Pulses:  2+ and symmetric    Skin:  No jaundice, rashes, or lesions    Lymph nodes:  No palpable cervical lymphadenopathy        Lab Results:   No visits with results within 1 Day(s) from this visit.   Latest known visit with results is:   Appointment on 05/02/2024   Component Date Value   • Ventricular Rate 05/02/2024 61    • Atrial Rate 05/02/2024 61    • CO Interval 05/02/2024 196    • QRSD Interval 05/02/2024 94    • QT Interval 05/02/2024 412    • QTC Interval 05/02/2024 414    • P Axis 05/02/2024 25    • QRS Axis 05/02/2024 80    • T Wave Axis 05/02/2024 53        Lab Results   Component Value Date    WBC 6.18 04/09/2024    HGB 13.7 04/09/2024    HCT 39.6 04/09/2024    MCV 90 04/09/2024     04/09/2024       Lab Results   Component Value Date    SODIUM 137 04/09/2024    K 3.7 04/09/2024     04/09/2024    CO2 27 04/09/2024    AGAP 8 04/09/2024    BUN 13 04/09/2024    CREATININE 0.80 04/09/2024    GLUC 87 09/03/2022    GLUF 85 04/09/2024    CALCIUM 9.4 04/09/2024    AST 15 04/09/2024    ALT 12 04/09/2024    ALKPHOS 46 04/09/2024    TP 7.2 04/09/2024    TBILI 0.48 04/09/2024    EGFR 101 04/09/2024       No results found for: \"CRP\"    Lab Results   Component Value Date    KLK3SSPEKPEY 1.114 04/09/2024    TSH 2.01 12/01/2022       No results found for: \"IRON\", \"TIBC\", \"FERRITIN\"    Radiology Results:   No results found.  "

## 2024-07-25 DIAGNOSIS — Q24.8 PERICARDIAL CYST: Primary | ICD-10-CM

## 2024-08-06 ENCOUNTER — CONSULT (OUTPATIENT)
Dept: CARDIAC SURGERY | Facility: CLINIC | Age: 29
End: 2024-08-06
Payer: COMMERCIAL

## 2024-08-06 VITALS
DIASTOLIC BLOOD PRESSURE: 78 MMHG | WEIGHT: 196.65 LBS | OXYGEN SATURATION: 98 % | HEIGHT: 66 IN | HEART RATE: 68 BPM | BODY MASS INDEX: 31.6 KG/M2 | TEMPERATURE: 97.8 F | SYSTOLIC BLOOD PRESSURE: 116 MMHG

## 2024-08-06 DIAGNOSIS — Q24.8 PERICARDIAL CYST: ICD-10-CM

## 2024-08-06 PROCEDURE — 99244 OFF/OP CNSLTJ NEW/EST MOD 40: CPT | Performed by: THORACIC SURGERY (CARDIOTHORACIC VASCULAR SURGERY)

## 2024-08-06 NOTE — PROGRESS NOTES
Thoracic Consult  Assessment/Plan:    Pericardial cyst  Ms. Isabel has a pericardial cyst along her right heart border.  This appears completely benign.  We will plan for repeat imaging in 12 months to confirm stability.  If the pericardial cyst is stable in 12 months we will spread the imaging out to 24-month intervals.  She knows that she can call the office at anytime with questions or concerns.       Diagnoses and all orders for this visit:    Pericardial cyst  -     Ambulatory Referral to Thoracic Surgery  -     CT chest wo contrast; Future    Other orders  -     Cholecalciferol (Vitamin D-3) 5000 UNIT/ML LIQD          Thoracic History   Diagnosis: Pericardial cyst, 3 cm   Procedures/Surgeries:    Pathology:    Adjuvant Therapy:       Subjective:    Patient ID: Vivi Isabel is a 28 y.o. female.    Ms. Isabel has been referred by Dr. Shirley for evaluation of a incidentally noted pericardial cyst.  The abnormality was detected on a chest CT scan performed to evaluate a left-sided chest wall irregularity.  This scan was performed June 26, 2024, is personally reviewed, and does not demonstrate any osseous abnormalities.  It does demonstrate a 3.3 cm right sided cyst along the right heart border pericardium.  There are no concerning pulmonary nodules nor is there any worrisome mediastinal or hilar lymphadenopathy.    She noticed some asymmetry to her chest wall just to the left of her angle of Ernie.  This area was not painful.  She denies fever, chills, shortness of breath, chest pressure.  She denies trauma to this area.  She denies right-sided chest pain.        The following portions of the patient's history were reviewed and updated as appropriate: allergies, current medications, past family history, past medical history, past social history, past surgical history, and problem list.    Past Medical History:   Diagnosis Date    Anxiety 09/05/2019    AV block, 1st degree     BMI 29.0-29.9,adult 2/24/2021     Chlamydia 2015    COVID-19 virus infection 01/11/2021    Obesity     SOB (shortness of breath) 02/23/2021      Past Surgical History:   Procedure Laterality Date    ELBOW SURGERY Right     SURGERY OF LIP      Piercing removal, pt reported      Family History   Problem Relation Age of Onset    Hypertension Father       Social History     Socioeconomic History    Marital status: /Civil Union     Spouse name: Not on file    Number of children: Not on file    Years of education: Not on file    Highest education level: Not on file   Occupational History    Not on file   Tobacco Use    Smoking status: Never    Smokeless tobacco: Never   Vaping Use    Vaping status: Never Used   Substance and Sexual Activity    Alcohol use: Yes     Comment: socially    Drug use: Never    Sexual activity: Yes     Partners: Male     Birth control/protection: None   Other Topics Concern    Not on file   Social History Narrative    Not on file     Social Determinants of Health     Financial Resource Strain: Low Risk  (5/18/2021)    Overall Financial Resource Strain (CARDIA)     Difficulty of Paying Living Expenses: Not hard at all   Food Insecurity: No Food Insecurity (5/18/2021)    Hunger Vital Sign     Worried About Running Out of Food in the Last Year: Never true     Ran Out of Food in the Last Year: Never true   Transportation Needs: No Transportation Needs (5/18/2021)    PRAPARE - Transportation     Lack of Transportation (Medical): No     Lack of Transportation (Non-Medical): No   Physical Activity: Sufficiently Active (5/18/2021)    Exercise Vital Sign     Days of Exercise per Week: 3 days     Minutes of Exercise per Session: 60 min   Stress: Stress Concern Present (9/14/2022)    [employer] [place] [employer] - Occupational Stress Questionnaire     Feeling of Stress : Rather much   Social Connections: Socially Isolated (5/18/2021)    Social Connection and Isolation Panel [NHANES]     Frequency of Communication with  Friends and Family: More than three times a week     Frequency of Social Gatherings with Friends and Family: Once a week     Attends Latter day Services: Never     Active Member of Clubs or Organizations: No     Attends Club or Organization Meetings: Never     Marital Status: Never    Intimate Partner Violence: Not At Risk (5/18/2021)    Humiliation, Afraid, Rape, and Kick questionnaire     Fear of Current or Ex-Partner: No     Emotionally Abused: No     Physically Abused: No     Sexually Abused: No   Housing Stability: Not on file      Review of Systems   Constitutional:  Negative for activity change, appetite change, chills, fever and unexpected weight change.   HENT:  Negative for voice change.    Respiratory:  Negative for cough, shortness of breath and wheezing.    Cardiovascular:  Negative for chest pain, palpitations and leg swelling.   Gastrointestinal:  Negative for abdominal pain.   Musculoskeletal:  Negative for arthralgias and myalgias.   Skin:  Negative for rash.   Neurological:  Negative for dizziness, seizures, weakness, numbness and headaches.   Hematological:  Negative for adenopathy.   Psychiatric/Behavioral:  Negative for confusion.          Objective:   Physical Exam  Constitutional:       Appearance: She is well-developed.   HENT:      Head: Normocephalic and atraumatic.      Mouth/Throat:      Pharynx: No oropharyngeal exudate.   Eyes:      General: No scleral icterus.     Extraocular Movements: EOM normal.      Conjunctiva/sclera: Conjunctivae normal.      Pupils: Pupils are equal, round, and reactive to light.   Neck:      Thyroid: No thyromegaly.      Trachea: No tracheal deviation.   Cardiovascular:      Rate and Rhythm: Normal rate and regular rhythm.      Heart sounds: Normal heart sounds.   Pulmonary:      Effort: Pulmonary effort is normal. No respiratory distress.      Breath sounds: Normal breath sounds. No wheezing.   Abdominal:      General: There is no distension.       "Palpations: Abdomen is soft.      Tenderness: There is no abdominal tenderness.   Musculoskeletal:         General: Normal range of motion.      Cervical back: Normal range of motion and neck supple.   Lymphadenopathy:      Cervical: No cervical adenopathy.   Skin:     General: Skin is warm and dry.   Neurological:      Mental Status: She is alert and oriented to person, place, and time.   Psychiatric:         Mood and Affect: Mood and affect normal.         Behavior: Behavior normal.         Thought Content: Thought content normal.         Judgment: Judgment normal.     /78   Pulse 68   Temp 97.8 °F (36.6 °C)   Ht 5' 6\" (1.676 m)   Wt 89.2 kg (196 lb 10.4 oz)   SpO2 98%   BMI 31.74 kg/m²       CT chest w contrast    Result Date: 6/26/2024  Narrative CT CHEST WITH IV CONTRAST INDICATION: R22.2: Localized swelling, mass and lump, trunk. COMPARISON: None. TECHNIQUE: CT examination of the chest was performed. Multiplanar 2D reformatted images were created from the source data. This examination, like all CT scans performed in the [facility], was performed utilizing techniques to minimize radiation dose exposure, including the use of iterative reconstruction and automated exposure control. Radiation dose length product (DLP) for this visit: 167 mGy-cm IV Contrast: 85 mL of iohexol (OMNIPAQUE) FINDINGS: LUNGS: Lungs are clear. There is no tracheal or endobronchial lesion. PLEURA: Unremarkable. HEART/GREAT VESSELS: Heart is unremarkable for patient's age. No thoracic aortic aneurysm. MEDIASTINUM AND SKYLER: Abutting the right mediastinal margin and portion of the heart there is a water density structure measuring 3.3 x 2.0 cm. No enlarged adenopathy. CHEST WALL AND LOWER NECK: Unremarkable. VISUALIZED STRUCTURES IN THE UPPER ABDOMEN: Unremarkable. OSSEOUS STRUCTURES: No acute fracture or destructive osseous lesion. In particular, no rib lesion identified.     Impression 1. No chest wall mass or " rib lesion identified. 2. Cystic structure abutting the mediastinum and right heart border favored to represent a pericardial cyst. Workstation performed: [id number]

## 2024-08-06 NOTE — ASSESSMENT & PLAN NOTE
Ms. Isabel has a pericardial cyst along her right heart border.  This appears completely benign.  We will plan for repeat imaging in 12 months to confirm stability.  If the pericardial cyst is stable in 12 months we will spread the imaging out to 24-month intervals.  She knows that she can call the office at anytime with questions or concerns.

## 2024-08-22 ENCOUNTER — PATIENT MESSAGE (OUTPATIENT)
Dept: FAMILY MEDICINE CLINIC | Facility: CLINIC | Age: 29
End: 2024-08-22

## 2024-09-13 ENCOUNTER — APPOINTMENT (OUTPATIENT)
Dept: LAB | Facility: CLINIC | Age: 29
End: 2024-09-13
Payer: COMMERCIAL

## 2024-09-13 ENCOUNTER — OFFICE VISIT (OUTPATIENT)
Dept: FAMILY MEDICINE CLINIC | Facility: CLINIC | Age: 29
End: 2024-09-13
Payer: COMMERCIAL

## 2024-09-13 VITALS
SYSTOLIC BLOOD PRESSURE: 110 MMHG | HEIGHT: 65 IN | OXYGEN SATURATION: 98 % | WEIGHT: 202.6 LBS | BODY MASS INDEX: 33.76 KG/M2 | HEART RATE: 72 BPM | DIASTOLIC BLOOD PRESSURE: 78 MMHG

## 2024-09-13 DIAGNOSIS — Z13.1 SCREENING FOR DIABETES MELLITUS: ICD-10-CM

## 2024-09-13 DIAGNOSIS — R07.81 RIB PAIN: ICD-10-CM

## 2024-09-13 DIAGNOSIS — E83.42 HYPOMAGNESEMIA: ICD-10-CM

## 2024-09-13 DIAGNOSIS — E55.9 VITAMIN D DEFICIENCY: ICD-10-CM

## 2024-09-13 DIAGNOSIS — Q24.8 PERICARDIAL CYST: Primary | ICD-10-CM

## 2024-09-13 LAB
25(OH)D3 SERPL-MCNC: 50.4 NG/ML (ref 30–100)
EST. AVERAGE GLUCOSE BLD GHB EST-MCNC: 105 MG/DL
HBA1C MFR BLD: 5.3 %
MAGNESIUM SERPL-MCNC: 1.9 MG/DL (ref 1.9–2.7)

## 2024-09-13 PROCEDURE — 82306 VITAMIN D 25 HYDROXY: CPT

## 2024-09-13 PROCEDURE — 83735 ASSAY OF MAGNESIUM: CPT

## 2024-09-13 PROCEDURE — 36415 COLL VENOUS BLD VENIPUNCTURE: CPT

## 2024-09-13 PROCEDURE — 99214 OFFICE O/P EST MOD 30 MIN: CPT | Performed by: FAMILY MEDICINE

## 2024-09-13 PROCEDURE — 83036 HEMOGLOBIN GLYCOSYLATED A1C: CPT

## 2024-09-13 NOTE — ASSESSMENT & PLAN NOTE
Patient seen by cardiothoracic surgery; no plans for surgical intervention at this time. Repeat CT chest in one year to assess for stability

## 2024-09-13 NOTE — PROGRESS NOTES
"Ambulatory Visit  Name: Vivi Isabel      : 1995      MRN: [id number]  Encounter Provider: Omayra Shirley MD  Encounter Date: 2024   Encounter department: [facility]    Assessment & Plan  Pericardial cyst  Patient seen by cardiothoracic surgery; no plans for surgical intervention at this time. Repeat CT chest in one year to assess for stability       Rib pain  Discussed medication options as well as referral to physical therapy or chiropractor. Patient interested in referral to chiropractic medicine  Orders:    Ambulatory Referral to Chiropractic; Future    Vitamin D deficiency  Repeat vitamin D ordered; continue supplements  Orders:    Vitamin D 25 hydroxy; Future    Hypomagnesemia  Repeat magnesium levels ordered  Orders:    Magnesium; Future    Screening for diabetes mellitus    Orders:    Hemoglobin A1C; Future       History of Present Illness     Vivi Isabel is a 29 year old female who presents today for a follow up. Patient was seen by Cardiothoracic surgery for evaluation of a pericardial cyst noted on CT chest. At this time there are no plans for surgical intervention although she will need a repeat CT chest in 1 year to assess for stability. She continues to have pain in her rib and is wondering what can be done for this. She does not wish to take any medication for the pain. She is also requesting blood work and re-screening for diabetes.       Review of Systems   Constitutional: Negative.    HENT: Negative.     Eyes: Negative.    Respiratory: Negative.     Cardiovascular: Negative.  Negative for chest pain and palpitations.   Gastrointestinal: Negative.    Genitourinary: Negative.    Musculoskeletal:         Rib pain   Skin: Negative.    Neurological: Negative.    Psychiatric/Behavioral: Negative.             Objective     /78 (BP Location: Left arm, Patient Position: Sitting, Cuff Size: Large)   Pulse 72   Ht 5' 5\" (1.651 m)   Wt 91.9 kg (202 lb 9.6 oz)   LMP " 09/02/2024 (Approximate)   SpO2 98%   BMI 33.71 kg/m²     Physical Exam  Constitutional:       General: She is not in acute distress.     Appearance: She is not ill-appearing.   HENT:      Head: Normocephalic and atraumatic.      Mouth/Throat:      Mouth: Mucous membranes are moist.      Pharynx: No oropharyngeal exudate or posterior oropharyngeal erythema.   Eyes:      General:         Right eye: No discharge.         Left eye: No discharge.      Extraocular Movements: Extraocular movements intact.   Cardiovascular:      Rate and Rhythm: Normal rate.   Pulmonary:      Effort: Pulmonary effort is normal. No respiratory distress.      Breath sounds: No wheezing.   Chest:      Chest wall: Mass (left sided, non tender to palpation) present.   Abdominal:      General: Bowel sounds are normal. There is no distension.      Palpations: Abdomen is soft.      Tenderness: There is no abdominal tenderness. There is no guarding.   Neurological:      General: No focal deficit present.      Mental Status: She is alert.   Psychiatric:         Mood and Affect: Mood normal.         Behavior: Behavior normal.

## 2024-09-17 ENCOUNTER — TELEPHONE (OUTPATIENT)
Age: 29
End: 2024-09-17

## 2024-10-08 ENCOUNTER — PATIENT MESSAGE (OUTPATIENT)
Dept: FAMILY MEDICINE CLINIC | Facility: CLINIC | Age: 29
End: 2024-10-08

## 2024-10-23 DIAGNOSIS — R07.81 RIB PAIN: Primary | ICD-10-CM

## 2024-12-26 ENCOUNTER — OFFICE VISIT (OUTPATIENT)
Age: 29
End: 2024-12-26
Payer: COMMERCIAL

## 2024-12-26 VITALS — WEIGHT: 202 LBS | HEIGHT: 65 IN | BODY MASS INDEX: 33.66 KG/M2

## 2024-12-26 DIAGNOSIS — R07.89 COSTOCHONDRAL PAIN: ICD-10-CM

## 2024-12-26 DIAGNOSIS — M79.18 MYOFASCIAL PAIN: ICD-10-CM

## 2024-12-26 DIAGNOSIS — R07.89 STERNOCOSTAL PAIN: Primary | ICD-10-CM

## 2024-12-26 DIAGNOSIS — R07.81 RIB PAIN: ICD-10-CM

## 2024-12-26 PROCEDURE — 99203 OFFICE O/P NEW LOW 30 MIN: CPT | Performed by: CHIROPRACTOR

## 2024-12-26 PROCEDURE — 98940 CHIROPRACT MANJ 1-2 REGIONS: CPT | Performed by: CHIROPRACTOR

## 2024-12-26 NOTE — PROGRESS NOTES
Assessment/Plan:    1. Sternocostal pain        2. Costochondral pain        3. Myofascial pain        4. Rib pain  Ambulatory Referral to Chiropractic          Review of Diagnostic Studies and Office Notes:    The patient's report from a CT scan of the chest, x-ray report of ribs, were reviewed prior to the chiropractic evaluation today.    Study - 6/19/24    Narrative & Impression   CT CHEST WITH IV CONTRAST     INDICATION: R22.2: Localized swelling, mass and lump, trunk.     COMPARISON: None.     TECHNIQUE: CT examination of the chest was performed. Multiplanar 2D reformatted images were created from the source data.     This examination, like all CT scans performed in the [facility], was performed utilizing techniques to minimize radiation dose exposure, including the use of iterative reconstruction and automated exposure control. Radiation dose length   product (DLP) for this visit: 167 mGy-cm     IV Contrast: 85 mL of iohexol (OMNIPAQUE)     FINDINGS:     LUNGS: Lungs are clear. There is no tracheal or endobronchial lesion.     PLEURA: Unremarkable.     HEART/GREAT VESSELS: Heart is unremarkable for patient's age. No thoracic aortic aneurysm.     MEDIASTINUM AND SKYLER: Abutting the right mediastinal margin and portion of the heart there is a water density structure measuring 3.3 x 2.0 cm. No enlarged adenopathy.     CHEST WALL AND LOWER NECK: Unremarkable.     VISUALIZED STRUCTURES IN THE UPPER ABDOMEN: Unremarkable.     OSSEOUS STRUCTURES: No acute fracture or destructive osseous lesion. In particular, no rib lesion identified.     IMPRESSION:     1. No chest wall mass or rib lesion identified.     2. Cystic structure abutting the mediastinum and right heart border favored to represent a pericardial cyst.       XR RIBS LEFT W PA CHEST MIN 3 VIEWS  Order: [id number]  Impression    Impression:  No evidence of fracture or osseous lesion. No erosive changes. No abnormality of  the second left rib.  Correlate with ultrasound or MRI if palpable mass persists.    Workstation:[id number]  Narrative    Ribs Radiographs    Clinical indication: Palpable mass second left rib    Technique: Multiple views of the left ribs were obtained.    Comparison: None available.    Findings:    There is no fracture or osseous lesion. The ribs are intact. No erosive changes  The visualized portion of the lungs appears unremarkable. There are no  radiopaque foreign bodies.    Exam End: 05/06/24  8:22 PM   Reviewed above reports.       Decision and Treatment Plan:  I reviewed my findings with the patient today. Patient was interested in receiving trial of chiropractic care and was treated today. We will treat the patient 1x/week. If there is improvement in symptoms and objective findings, frequency will be reuced.    The patient will be treated with conservative chiropractic care including myofascial release, joint mobilization, a home stretching and icing program.    The patient was taught a stretch today. The patient was advised to do the stretch for 3 sets of 15-20 seconds several times per day. The need to stretch to the point of tension only was discussed.     Patient Instructions:    She is aware to go to the ED if she develops any SOB, dizziness, nausea, vomiting, left arm pain.  Advised to make her cardiothoracic surgeon aware of the pain she is having in the left upper anterior rib cage as she wasn't sure she did make him aware of this pain, however it does appear related to cartilage between sternum and rib. Will give it a trial of treatment.     Ice 15 minutes as needed for posttreatment soreness.    She was given stretching exercise to be done at home twice daily.    Time/Reviewed with Patient:    Time:  At least 31 minutes of time was spent with the patient during the consultation including the patient's history/current complaints, physical exam, reviewing the diagnostic report, reviewing home instruction/reducing risk  "factors with the patient, reviewing my findings with the patient, and discussing the treatment with the patient.     This is a separate identifiable portion of today's visit from the E and M code billed.    Treatment today consisted of myofascial release to the left pectoralis musculature as well as left scalenes.  Gentle scapulothoracic mobilization was performed.  Gentle mobilization was performed to T3-4 and T5-6 utilizing a very gentle double thenar contact.    Review of Systems   Constitutional:  Negative for chills, fever and unexpected weight change.   Gastrointestinal:  Negative for constipation and diarrhea.   Genitourinary:  Negative for difficulty urinating and urgency.       Subjective:    Vivi Isabel is a 29 y.o. female presents today for chiropractic evaluation and possible treatment.  She has chief complaint of left-sided upper chest pain just lateral to the sternum.  She states symptoms have been present in this area for approximately 6 months.  She does recall that it started after she was weight lifting.  Upper body work with free weights and extended her shoulder with the free weights when she started feeling the pain.  She states it would come and go.  She also was worked up for cardiac issues send it has been noted that she has a pericardial cyst in the right mediastinum area of the right heart border.  Is being followed by cardiothoracic surgery.      Chest pain it increases with certain movements such as twisting her upper back.  The pain does come and go and is sharp at times.  She also notices it when getting/pushing herself up from a seated position. Pain is sharp with certain movements.     She denies palpitations, shortness of breath, pain radiating down the arms, weakness, dizziness, lightheadedness, visual disturbances.  She denies numbness or tingling in the upper extremities.  She denies any previous trauma to the chest or neck.       Objective:     Ht 5' 5\" (1.651 m)   Wt 91.6 kg " (202 lb)   BMI 33.61 kg/m²     Appearance: Well developed, well nourished, and in no acute distress    Alert and oriented x 3    Mood/Affect: calm and pleasant    Posture: in the seated position she has noted to have rounding of the shoulders and anterior head carriage.:    Gait: normal    Antalgic posture: none    Lordosis: Cervical- decreased    Lordosis: Lumbar- normal    Kyphosis: Thoracic- normal    Upper extremity reflexes:    Deep tendon reflexes were normal and symmetrical in the upper extremities.    Mccartney's was negative bilaterally.    Cervical Orthopedic Tests:    Maximal foraminal Compression on the Right: negative .    Maximal foraminal Compression on the Left: negative .    Active Cervical Ranges of Motion:    Cervical ROM is full and pain free with the exception of terminal left rotation which causes some pain in the left anterior chest. Right rotation causes some left chest pain.     Left shoulder range of motion is full and pain-free.    Palpation:    Palpation of the anterior portion of the left upper rib cage reveals tenderness with with appears to be elevated cartilage between the ribs and sternum at the distal end of the second rib.  Compression testing of the rib cage is negative.  No palpable tenderness along the rib cage or thoracic spine.     Palpable tenderness and hypertonicity of the left pectoralis musculature near the origin.  Trigger points are noted in the left scalenes as well as let superior pectoralis musculature near the origin.     There is joint dysfunction present at T3-4 and T5-6.     Dictation voice to text software has been used in the creation of this document. Please consider this in light of any contextual or grammatical errors.

## 2025-01-02 ENCOUNTER — OFFICE VISIT (OUTPATIENT)
Dept: FAMILY MEDICINE CLINIC | Facility: CLINIC | Age: 30
End: 2025-01-02
Payer: COMMERCIAL

## 2025-01-02 VITALS
DIASTOLIC BLOOD PRESSURE: 70 MMHG | HEIGHT: 66 IN | OXYGEN SATURATION: 96 % | TEMPERATURE: 96.9 F | WEIGHT: 194 LBS | HEART RATE: 86 BPM | BODY MASS INDEX: 31.18 KG/M2 | SYSTOLIC BLOOD PRESSURE: 110 MMHG

## 2025-01-02 DIAGNOSIS — R07.89 COSTOCHONDRAL PAIN: ICD-10-CM

## 2025-01-02 DIAGNOSIS — Q24.8 PERICARDIAL CYST: ICD-10-CM

## 2025-01-02 DIAGNOSIS — Z31.69 INFERTILITY COUNSELING: ICD-10-CM

## 2025-01-02 DIAGNOSIS — R07.81 RIB PAIN: ICD-10-CM

## 2025-01-02 DIAGNOSIS — Z00.00 ANNUAL PHYSICAL EXAM: Primary | ICD-10-CM

## 2025-01-02 PROCEDURE — 99395 PREV VISIT EST AGE 18-39: CPT | Performed by: FAMILY MEDICINE

## 2025-01-02 PROCEDURE — 99214 OFFICE O/P EST MOD 30 MIN: CPT | Performed by: FAMILY MEDICINE

## 2025-01-02 NOTE — PROGRESS NOTES
Adult Annual Physical  Name: Vivi Isabel      : 1995      MRN: [id number]  Encounter Provider: Omayra Shirley MD  Encounter Date: 2025   Encounter department: [facility]    Assessment & Plan  Annual physical exam  - Immunizations and preventive care screenings were discussed with patient today.   - Return to office 25 as scheduled for pap smear       Infertility counseling  - Referral to infertility medicine placed; in the meantime will order lab work including AMH, thyroid function testing, fsh, lh and progesterone  Orders:    CBC and differential; Future    Comprehensive metabolic panel; Future    Antimullerian hormone (AMH); Future    Progesterone; Future    FSH and LH; Future    Hemoglobin A1C; Future    TSH, 3rd generation with Free T4 reflex; Future    Ambulatory Referral to Infertility; Future    Rib pain  Patient had one sessions with Chiropractic medicine but unsure if it helped; interested in seeing Orthopedics. Referral placed  Orders:    Ambulatory Referral to Orthopedic Surgery; Future    Costochondral pain    Orders:    Ambulatory Referral to Orthopedic Surgery; Future    Pericardial cyst  - Follows with cardiothoracic surgery                History of Present Illness   Vivi Isabel is a 29 year old female who presents today for a physical. She was seen by a Chiropractor for her continued rib pain but is unsure if it was beneficial. She reports that it has been almost a year since her and her  starting trying to conceive and is wondering about next steps.     Adult Annual Physical:  Patient presents for annual physical.     Diet and Physical Activity:  - Diet/Nutrition: well balanced diet.  - Exercise: moderate cardiovascular exercise.    General Health:  - Sleep: sleeps well.  - Vision: no vision problems.  - Dental:. Overdue for dental visit    /GYN Health:    - Menopause: premenopausal.     Review of Systems   Constitutional: Negative.    HENT:  "Negative.     Eyes: Negative.    Respiratory: Negative.     Cardiovascular: Negative.    Gastrointestinal: Negative.    Genitourinary: Negative.    Musculoskeletal:  Positive for arthralgias.   Skin: Negative.    Neurological: Negative.    Psychiatric/Behavioral: Negative.           Objective   /70 (BP Location: Left arm, Patient Position: Sitting, Cuff Size: Large)   Pulse 86   Temp (!) 96.9 °F (36.1 °C) (Temporal)   Ht 5' 5.5\" (1.664 m)   Wt 88 kg (194 lb)   LMP 12/18/2024   SpO2 96%   BMI 31.79 kg/m²     Physical Exam  Constitutional:       General: She is not in acute distress.     Appearance: She is not ill-appearing.   HENT:      Head: Normocephalic and atraumatic.   Eyes:      General:         Right eye: No discharge.         Left eye: No discharge.      Extraocular Movements: Extraocular movements intact.   Cardiovascular:      Rate and Rhythm: Normal rate.   Pulmonary:      Effort: Pulmonary effort is normal. No respiratory distress.      Breath sounds: No wheezing.   Abdominal:      General: Bowel sounds are normal. There is no distension.      Palpations: Abdomen is soft.      Tenderness: There is no abdominal tenderness.   Musculoskeletal:      Right lower leg: No edema.      Left lower leg: No edema.   Neurological:      General: No focal deficit present.      Mental Status: She is alert.   Psychiatric:         Mood and Affect: Mood normal.         Behavior: Behavior normal.         "

## 2025-01-13 ENCOUNTER — TELEPHONE (OUTPATIENT)
Dept: FAMILY MEDICINE CLINIC | Facility: CLINIC | Age: 30
End: 2025-01-13

## 2025-01-23 ENCOUNTER — TELEPHONE (OUTPATIENT)
Age: 30
End: 2025-01-23

## 2025-01-23 ENCOUNTER — OFFICE VISIT (OUTPATIENT)
Dept: FAMILY MEDICINE CLINIC | Facility: CLINIC | Age: 30
End: 2025-01-23
Payer: COMMERCIAL

## 2025-01-23 VITALS
OXYGEN SATURATION: 98 % | WEIGHT: 192 LBS | SYSTOLIC BLOOD PRESSURE: 106 MMHG | HEIGHT: 66 IN | DIASTOLIC BLOOD PRESSURE: 62 MMHG | TEMPERATURE: 98.2 F | RESPIRATION RATE: 16 BRPM | HEART RATE: 75 BPM | BODY MASS INDEX: 30.86 KG/M2

## 2025-01-23 DIAGNOSIS — Z00.00 HEALTHCARE MAINTENANCE: ICD-10-CM

## 2025-01-23 DIAGNOSIS — R07.89 COSTOCHONDRAL PAIN: Primary | ICD-10-CM

## 2025-01-23 DIAGNOSIS — Q24.8 PERICARDIAL CYST: ICD-10-CM

## 2025-01-23 DIAGNOSIS — Z13.1 DIABETES MELLITUS SCREENING: ICD-10-CM

## 2025-01-23 DIAGNOSIS — R07.89 COSTOCHONDRAL PAIN: ICD-10-CM

## 2025-01-23 DIAGNOSIS — F41.9 ANXIETY: Primary | ICD-10-CM

## 2025-01-23 PROCEDURE — 99214 OFFICE O/P EST MOD 30 MIN: CPT | Performed by: NURSE PRACTITIONER

## 2025-01-23 RX ORDER — IBUPROFEN 600 MG/1
600 TABLET, FILM COATED ORAL EVERY 8 HOURS PRN
Qty: 30 TABLET | Refills: 1 | Status: SHIPPED | OUTPATIENT
Start: 2025-01-23

## 2025-01-23 RX ORDER — BUSPIRONE HYDROCHLORIDE 10 MG/1
10 TABLET ORAL 2 TIMES DAILY
Qty: 180 TABLET | Refills: 0 | Status: SHIPPED | OUTPATIENT
Start: 2025-01-23

## 2025-01-23 RX ORDER — MULTIVIT-MIN/IRON/FOLIC ACID/K 18-600-40
1000 CAPSULE ORAL DAILY
COMMUNITY

## 2025-01-23 NOTE — ASSESSMENT & PLAN NOTE
- Noted on CT of chest. Has follow up CT scheduled.   - Follow up with Cardiothoracic surgery.

## 2025-01-23 NOTE — TELEPHONE ENCOUNTER
Patient states medication discussed at visit was not sent to pharmacy. She was instructed to take 600-800 mg ibuprofen four times per day, but no medication was ordered. Please advise and send to [facility] pharmacy in [place]. Patient thanks us for our help!

## 2025-01-23 NOTE — PROGRESS NOTES
Name: Vivi Isabel      : 1995      MRN: [id number]  Encounter Provider: KAYLEE Ribeiro  Encounter Date: 2025   Encounter department: [facility]    Assessment & Plan  Anxiety  - Not well controlled.  - Will restart Buspar 10 mg twice daily. Discussed side effects.  - Recommend follow up in 6 weeks.   Orders:  •  busPIRone (BUSPAR) 10 mg tablet; Take 1 tablet (10 mg total) by mouth 2 (two) times a day    Pericardial cyst  - Noted on CT of chest. Has follow up CT scheduled.   - Follow up with Cardiothoracic surgery.        Costochondral pain  - Recommend rest and Ibuprofen 600-800 mg 3-4 times daily. Discussed side effects.  - She was previously referred to Orthopedic Surgery.        Healthcare maintenance    Orders:  •  CBC and differential; Future  •  Comprehensive metabolic panel; Future  •  Lipid Panel with Direct LDL reflex; Future  •  TSH, 3rd generation with Free T4 reflex; Future    Diabetes mellitus screening    Orders:  •  Hemoglobin A1C; Future         History of Present Illness     Patient with PMH of anxiety presents to office today to establish care. She is not taking any prescription medications on a daily basis. Takes supplements to help with her anxiety but feels it is not working enough. Was previously on Buspar. She is interested in restarting. She has complaints of costochondral pain. Seen by chiropractor in December. 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 She had CT of chest which showed no chest wall mass or rib lesion. Did show suspected pericardial cyst which she has follow up CT ordered for . She was previously referred to Ortho for her costochondral pain but has not been seen yet. She does not take anything OTC for her pain. She is due for updated blood work. She denies any other concerns or complaints today.         Review of Systems   Constitutional:  Negative for fatigue and fever.    HENT:  Negative for trouble swallowing.    Eyes:  Negative for visual disturbance.   Respiratory:  Negative for cough and shortness of breath.    Cardiovascular:  Positive for chest pain (chest wall). Negative for palpitations.   Gastrointestinal:  Negative for abdominal pain and blood in stool.   Endocrine: Negative for cold intolerance and heat intolerance.   Genitourinary:  Negative for difficulty urinating and dysuria.   Musculoskeletal:  Negative for gait problem.   Skin:  Negative for rash.   Neurological:  Negative for dizziness, syncope and headaches.   Hematological:  Negative for adenopathy.   Psychiatric/Behavioral:  Negative for behavioral problems. The patient is nervous/anxious.      Past Medical History:   Diagnosis Date   • Anxiety 09/05/2019   • AV block, 1st degree    • BMI 29.0-29.9,adult 2/24/2021   • Chlamydia 2015   • COVID-19 virus infection 01/11/2021   • Obesity    • SOB (shortness of breath) 02/23/2021     Past Surgical History:   Procedure Laterality Date   • ELBOW SURGERY Right    • SURGERY OF LIP      Piercing removal, pt reported     Family History   Problem Relation Age of Onset   • Diabetes Father    • Hypertension Father      Social History     Tobacco Use   • Smoking status: Never   • Smokeless tobacco: Never   Vaping Use   • Vaping status: Never Used   Substance and Sexual Activity   • Alcohol use: Yes     Comment: socially   • Drug use: Never   • Sexual activity: Yes     Partners: Male     Birth control/protection: None     Current Outpatient Medications on File Prior to Visit   Medication Sig   • Prenatal Vit-Iron Carbonyl-FA (prenatal multivitamin) TABS    • Vitamin D, Cholecalciferol, 25 MCG (1000 UT) TABS Take 1,000 Units by mouth in the morning   • Cholecalciferol (Vitamin D) 125 MCG (5000 UT) CAPS Take 5,000 Units by mouth daily (Patient not taking: Reported on 1/23/2025)   • Cholecalciferol (Vitamin D-3) 5000 UNIT/ML LIQD  (Patient not taking: Reported on 1/23/2025)   •  "[DISCONTINUED] dicyclomine (BENTYL) 10 mg capsule Take 1 capsule (10 mg total) by mouth 3 (three) times a day before meals   • [DISCONTINUED] Magnesium 300 MG CAPS    • [DISCONTINUED] NON FORMULARY Zenium   • [DISCONTINUED] polyethylene glycol (MIRALAX) 17 g packet Take 17 g by mouth daily     No Known Allergies  Immunization History   Administered Date(s) Administered   • COVID-19 PFIZER VACCINE 0.3 ML IM 11/08/2021, 11/29/2021   • Hep B, adult 03/18/2016   • INFLUENZA 10/26/2023   • Influenza, injectable, quadrivalent, preservative free 0.5 mL 09/10/2020   • Influenza, recombinant, quadrivalent,injectable, preservative free 09/05/2019   • Influenza, seasonal, injectable, preservative free 10/29/2024   • MMR 08/27/2015, 03/18/2016   • Tdap 08/27/2015, 09/05/2019   • Tuberculin Skin Test-PPD Intradermal 06/11/2018, 06/25/2018, 09/05/2019, 04/05/2021   • Varicella 09/30/2021     Objective   /62 (BP Location: Left arm, Patient Position: Sitting, Cuff Size: Large)   Pulse 75   Temp 98.2 °F (36.8 °C) (Tympanic)   Resp 16   Ht 5' 5.5\" (1.664 m)   Wt 87.1 kg (192 lb)   LMP 12/18/2024   SpO2 98%   BMI 31.46 kg/m²     Physical Exam  Vitals and nursing note reviewed.   Constitutional:       Appearance: Normal appearance. She is well-developed.   HENT:      Head: Normocephalic and atraumatic.      Right Ear: External ear normal.      Left Ear: External ear normal.   Eyes:      Conjunctiva/sclera: Conjunctivae normal.   Cardiovascular:      Rate and Rhythm: Normal rate and regular rhythm.      Heart sounds: Normal heart sounds.   Pulmonary:      Effort: Pulmonary effort is normal.      Breath sounds: Normal breath sounds.   Musculoskeletal:         General: Normal range of motion.      Cervical back: Normal range of motion.   Skin:     General: Skin is warm and dry.   Neurological:      Mental Status: She is alert and oriented to person, place, and time.   Psychiatric:         Mood and Affect: Mood normal.        "  Behavior: Behavior normal.

## 2025-01-23 NOTE — ASSESSMENT & PLAN NOTE
- Recommend rest and Ibuprofen 600-800 mg 3-4 times daily. Discussed side effects.  - She was previously referred to Orthopedic Surgery.

## 2025-01-23 NOTE — ASSESSMENT & PLAN NOTE
- Not well controlled.  - Will restart Buspar 10 mg twice daily. Discussed side effects.  - Recommend follow up in 6 weeks.   Orders:  •  busPIRone (BUSPAR) 10 mg tablet; Take 1 tablet (10 mg total) by mouth 2 (two) times a day   No Yes

## 2025-01-24 ENCOUNTER — APPOINTMENT (OUTPATIENT)
Dept: LAB | Age: 30
End: 2025-01-24
Payer: COMMERCIAL

## 2025-01-24 DIAGNOSIS — Z13.1 DIABETES MELLITUS SCREENING: ICD-10-CM

## 2025-01-24 DIAGNOSIS — Z00.00 HEALTHCARE MAINTENANCE: ICD-10-CM

## 2025-01-24 LAB
ALBUMIN SERPL BCG-MCNC: 4.2 G/DL (ref 3.5–5)
ALP SERPL-CCNC: 49 U/L (ref 34–104)
ALT SERPL W P-5'-P-CCNC: 15 U/L (ref 7–52)
ANION GAP SERPL CALCULATED.3IONS-SCNC: 6 MMOL/L (ref 4–13)
AST SERPL W P-5'-P-CCNC: 18 U/L (ref 13–39)
BASOPHILS # BLD AUTO: 0.03 THOUSANDS/ΜL (ref 0–0.1)
BASOPHILS NFR BLD AUTO: 1 % (ref 0–1)
BILIRUB SERPL-MCNC: 0.56 MG/DL (ref 0.2–1)
BUN SERPL-MCNC: 11 MG/DL (ref 5–25)
CALCIUM SERPL-MCNC: 10 MG/DL (ref 8.4–10.2)
CHLORIDE SERPL-SCNC: 105 MMOL/L (ref 96–108)
CHOLEST SERPL-MCNC: 153 MG/DL (ref ?–200)
CO2 SERPL-SCNC: 30 MMOL/L (ref 21–32)
CREAT SERPL-MCNC: 0.82 MG/DL (ref 0.6–1.3)
EOSINOPHIL # BLD AUTO: 0.07 THOUSAND/ΜL (ref 0–0.61)
EOSINOPHIL NFR BLD AUTO: 2 % (ref 0–6)
ERYTHROCYTE [DISTWIDTH] IN BLOOD BY AUTOMATED COUNT: 12.4 % (ref 11.6–15.1)
EST. AVERAGE GLUCOSE BLD GHB EST-MCNC: 97 MG/DL
GFR SERPL CREATININE-BSD FRML MDRD: 97 ML/MIN/1.73SQ M
GLUCOSE P FAST SERPL-MCNC: 87 MG/DL (ref 65–99)
HBA1C MFR BLD: 5 %
HCT VFR BLD AUTO: 42.4 % (ref 34.8–46.1)
HDLC SERPL-MCNC: 70 MG/DL
HGB BLD-MCNC: 13.8 G/DL (ref 11.5–15.4)
IMM GRANULOCYTES # BLD AUTO: 0.01 THOUSAND/UL (ref 0–0.2)
IMM GRANULOCYTES NFR BLD AUTO: 0 % (ref 0–2)
LDLC SERPL CALC-MCNC: 75 MG/DL (ref 0–100)
LYMPHOCYTES # BLD AUTO: 1.89 THOUSANDS/ΜL (ref 0.6–4.47)
LYMPHOCYTES NFR BLD AUTO: 40 % (ref 14–44)
MCH RBC QN AUTO: 30.7 PG (ref 26.8–34.3)
MCHC RBC AUTO-ENTMCNC: 32.5 G/DL (ref 31.4–37.4)
MCV RBC AUTO: 94 FL (ref 82–98)
MONOCYTES # BLD AUTO: 0.32 THOUSAND/ΜL (ref 0.17–1.22)
MONOCYTES NFR BLD AUTO: 7 % (ref 4–12)
NEUTROPHILS # BLD AUTO: 2.47 THOUSANDS/ΜL (ref 1.85–7.62)
NEUTS SEG NFR BLD AUTO: 50 % (ref 43–75)
NRBC BLD AUTO-RTO: 0 /100 WBCS
PLATELET # BLD AUTO: 271 THOUSANDS/UL (ref 149–390)
PMV BLD AUTO: 10 FL (ref 8.9–12.7)
POTASSIUM SERPL-SCNC: 4 MMOL/L (ref 3.5–5.3)
PROT SERPL-MCNC: 7.9 G/DL (ref 6.4–8.4)
RBC # BLD AUTO: 4.49 MILLION/UL (ref 3.81–5.12)
SODIUM SERPL-SCNC: 141 MMOL/L (ref 135–147)
TRIGL SERPL-MCNC: 41 MG/DL (ref ?–150)
TSH SERPL DL<=0.05 MIU/L-ACNC: 0.6 UIU/ML (ref 0.45–4.5)
WBC # BLD AUTO: 4.79 THOUSAND/UL (ref 4.31–10.16)

## 2025-01-24 PROCEDURE — 83036 HEMOGLOBIN GLYCOSYLATED A1C: CPT

## 2025-01-24 PROCEDURE — 80053 COMPREHEN METABOLIC PANEL: CPT

## 2025-01-24 PROCEDURE — 84443 ASSAY THYROID STIM HORMONE: CPT

## 2025-01-24 PROCEDURE — 80061 LIPID PANEL: CPT

## 2025-01-24 PROCEDURE — 85025 COMPLETE CBC W/AUTO DIFF WBC: CPT

## 2025-01-24 PROCEDURE — 36415 COLL VENOUS BLD VENIPUNCTURE: CPT

## 2025-01-28 ENCOUNTER — RESULTS FOLLOW-UP (OUTPATIENT)
Dept: FAMILY MEDICINE CLINIC | Facility: CLINIC | Age: 30
End: 2025-01-28

## 2025-02-21 DIAGNOSIS — F41.9 ANXIETY: Primary | ICD-10-CM

## 2025-02-21 RX ORDER — BUSPIRONE HYDROCHLORIDE 15 MG/1
15 TABLET ORAL 2 TIMES DAILY
Qty: 180 TABLET | Refills: 0 | Status: SHIPPED | OUTPATIENT
Start: 2025-02-21

## 2025-03-04 ENCOUNTER — OFFICE VISIT (OUTPATIENT)
Dept: URGENT CARE | Age: 30
End: 2025-03-04
Payer: COMMERCIAL

## 2025-03-04 VITALS
BODY MASS INDEX: 31.02 KG/M2 | OXYGEN SATURATION: 96 % | RESPIRATION RATE: 18 BRPM | SYSTOLIC BLOOD PRESSURE: 122 MMHG | TEMPERATURE: 98.3 F | DIASTOLIC BLOOD PRESSURE: 84 MMHG | HEIGHT: 66 IN | WEIGHT: 193 LBS | HEART RATE: 74 BPM

## 2025-03-04 DIAGNOSIS — Z71.1 WORRIED WELL: Primary | ICD-10-CM

## 2025-03-04 PROCEDURE — G0382 LEV 3 HOSP TYPE B ED VISIT: HCPCS

## 2025-03-04 PROCEDURE — S9083 URGENT CARE CENTER GLOBAL: HCPCS

## 2025-03-04 NOTE — PROGRESS NOTES
[facility]        NAME: Vivi Isabel is a 29 y.o. female  : 1995    MRN: [id number]  DATE: 2025  TIME: 10:40 AM    Assessment and Plan   Worried well [Z71.1]  1. Worried well          Cut right pointer finger over a week ago and concerned over healing as area of healing is not same color and feels thicker. Discussed skin overgrowth during healing however no redness, swelling, drainage or warmth. No concern for infection at this time.     Patient Instructions       Follow up with PCP in 3-5 days.  Proceed to  ER if symptoms worsen.    If tests have been performed at [facility], our office will contact you with results if changes need to be made to the care plan discussed with you at the visit.  You can review your full results on [facility] MyChart.    Chief Complaint     Chief Complaint   Patient presents with    Finger Injury     Patient reports she cut right pointer finger with knife X 1 week ago and is concerned that it is not healing properly.         History of Present Illness       Cut right pointer finger over a week ago and concerned over healing as area of healing is not same color and feels thicker. Discussed skin overgrowth during healing however no redness, swelling, drainage or warmth. No concern for infection at this time.         Review of Systems   Review of Systems   Skin:  Positive for color change.   All other systems reviewed and are negative.        Current Medications       Current Outpatient Medications:     ibuprofen (MOTRIN) 600 mg tablet, Take 1 tablet (600 mg total) by mouth every 8 (eight) hours as needed for mild pain, Disp: 30 tablet, Rfl: 1    Prenatal Vit-Iron Carbonyl-FA (prenatal multivitamin) TABS, , Disp: , Rfl:     Vitamin D, Cholecalciferol, 25 MCG (1000 UT) TABS, Take 1,000 Units by mouth in the morning, Disp: , Rfl:     busPIRone (BUSPAR) 15 mg tablet, Take 1 tablet (15 mg total) by mouth 2 (two) times a day (Patient not taking: Reported on  "3/4/2025), Disp: 180 tablet, Rfl: 0    Cholecalciferol (Vitamin D) 125 MCG (5000 UT) CAPS, Take 5,000 Units by mouth daily (Patient not taking: Reported on 1/23/2025), Disp: , Rfl:     Cholecalciferol (Vitamin D-3) 5000 UNIT/ML LIQD, , Disp: , Rfl:     Current Allergies     Allergies as of 03/04/2025    (No Known Allergies)            The following portions of the patient's history were reviewed and updated as appropriate: allergies, current medications, past family history, past medical history, past social history, past surgical history and problem list.     Past Medical History:   Diagnosis Date    Anxiety 09/05/2019    AV block, 1st degree     BMI 29.0-29.9,adult 2/24/2021    Chlamydia 2015    COVID-19 virus infection 01/11/2021    Obesity     SOB (shortness of breath) 02/23/2021       Past Surgical History:   Procedure Laterality Date    ELBOW SURGERY Right     SURGERY OF LIP      Piercing removal, pt reported       Family History   Problem Relation Age of Onset    Diabetes Father     Hypertension Father          Medications have been verified.        Objective   /84   Pulse 74   Temp 98.3 °F (36.8 °C)   Resp 18   Ht 5' 6\" (1.676 m)   Wt 87.5 kg (193 lb)   SpO2 96%   BMI 31.15 kg/m²   No LMP recorded.       Physical Exam     Physical Exam  Vitals reviewed.   Skin:     Findings: Lesion present.      Comments: Healing area of overgrown skin to right pointer finger. No redness, warmth or fluctuance                   "

## 2025-03-18 ENCOUNTER — TELEPHONE (OUTPATIENT)
Dept: FAMILY MEDICINE CLINIC | Facility: CLINIC | Age: 30
End: 2025-03-18

## 2025-03-18 ENCOUNTER — OFFICE VISIT (OUTPATIENT)
Dept: FAMILY MEDICINE CLINIC | Facility: CLINIC | Age: 30
End: 2025-03-18
Payer: COMMERCIAL

## 2025-03-18 VITALS
DIASTOLIC BLOOD PRESSURE: 70 MMHG | HEART RATE: 76 BPM | OXYGEN SATURATION: 98 % | WEIGHT: 186.4 LBS | SYSTOLIC BLOOD PRESSURE: 110 MMHG | RESPIRATION RATE: 16 BRPM | HEIGHT: 66 IN | TEMPERATURE: 97.6 F | BODY MASS INDEX: 29.96 KG/M2

## 2025-03-18 DIAGNOSIS — Z11.1 SCREENING-PULMONARY TB: ICD-10-CM

## 2025-03-18 DIAGNOSIS — R07.89 COSTOCHONDRAL PAIN: Primary | ICD-10-CM

## 2025-03-18 DIAGNOSIS — Q24.8 PERICARDIAL CYST: ICD-10-CM

## 2025-03-18 DIAGNOSIS — Z78.9 ATTEMPTING TO CONCEIVE: ICD-10-CM

## 2025-03-18 DIAGNOSIS — F41.9 ANXIETY: ICD-10-CM

## 2025-03-18 PROCEDURE — 99214 OFFICE O/P EST MOD 30 MIN: CPT | Performed by: NURSE PRACTITIONER

## 2025-03-18 NOTE — PROGRESS NOTES
Name: Vivi Isabel      : 1995      MRN: [id number]  Encounter Provider: KAYLEE Ribeiro  Encounter Date: 3/18/2025   Encounter department: [facility]    Assessment & Plan  Costochondral pain  - Has had no improvement with rest and use of Ibuprofen as needed.  - Declines physical therapy referral.  - Referred to Orthopedic Surgery.   Orders:  •  Ambulatory Referral to Orthopedic Surgery; Future    Anxiety  - No longer taking Buspar.   - Anxiety is well controlled. She is taking OTC supplement with ashwagandha.  - Will continue to monitor.          Pericardial cyst  - Noted on CT of chest. Has follow up CT scheduled.   - Follow up with Cardiothoracic surgery.        Attempting to conceive  - Referred to OB/GYN.   Orders:  •  Ambulatory Referral to Obstetrics / Gynecology; Future    Screening-pulmonary TB    Orders:  •  Quantiferon TB Gold Plus Assay; Future         History of Present Illness     Patient presents to office today for follow up regarding anxiety. She was started on Buspar. She is no longer taking it because she felt it didn't work. She is taking OTC supplements containing  ashwagandha which she feels helps. She had her blood work done which is all normal. She is requesting a referral to Gynecology. States her and her partner have been trying to conceive for over 1 year. Her LMP was 3/7/25. She is also requesting TB screening for work. She denies any other concerns or complaints today.             Review of Systems   Constitutional:  Negative for fatigue and fever.   HENT:  Negative for trouble swallowing.    Eyes:  Negative for visual disturbance.   Respiratory:  Negative for cough and shortness of breath.    Cardiovascular:  Positive for chest pain. Negative for palpitations.   Gastrointestinal:  Negative for abdominal pain and blood in stool.   Endocrine: Negative for cold intolerance and heat intolerance.   Genitourinary:  Negative for difficulty  urinating and dysuria.   Musculoskeletal:  Negative for gait problem.   Skin:  Negative for rash.   Neurological:  Negative for dizziness, syncope and headaches.   Hematological:  Negative for adenopathy.   Psychiatric/Behavioral:  Negative for behavioral problems.      Past Medical History:   Diagnosis Date   • Anxiety 09/05/2019   • AV block, 1st degree    • BMI 29.0-29.9,adult 2/24/2021   • Chlamydia 2015   • COVID-19 virus infection 01/11/2021   • Obesity    • SOB (shortness of breath) 02/23/2021     Past Surgical History:   Procedure Laterality Date   • ELBOW SURGERY Right    • SURGERY OF LIP      Piercing removal, pt reported     Family History   Problem Relation Age of Onset   • Diabetes Father    • Hypertension Father      Social History     Tobacco Use   • Smoking status: Never   • Smokeless tobacco: Never   Vaping Use   • Vaping status: Never Used   Substance and Sexual Activity   • Alcohol use: Yes     Comment: socially   • Drug use: Never   • Sexual activity: Yes     Partners: Male     Birth control/protection: None     Current Outpatient Medications on File Prior to Visit   Medication Sig   • Prenatal Vit-Iron Carbonyl-FA (prenatal multivitamin) TABS    • Vitamin D, Cholecalciferol, 25 MCG (1000 UT) TABS Take 1,000 Units by mouth in the morning   • busPIRone (BUSPAR) 15 mg tablet Take 1 tablet (15 mg total) by mouth 2 (two) times a day (Patient not taking: Reported on 3/4/2025)   • Cholecalciferol (Vitamin D) 125 MCG (5000 UT) CAPS Take 5,000 Units by mouth daily (Patient not taking: Reported on 1/23/2025)   • Cholecalciferol (Vitamin D-3) 5000 UNIT/ML LIQD  (Patient not taking: Reported on 3/4/2025)   • ibuprofen (MOTRIN) 600 mg tablet Take 1 tablet (600 mg total) by mouth every 8 (eight) hours as needed for mild pain (Patient not taking: Reported on 3/18/2025)     No Known Allergies  Immunization History   Administered Date(s) Administered   • COVID-19 PFIZER VACCINE 0.3 ML IM 11/08/2021, 11/29/2021  "  • Hep B, adult 03/18/2016   • INFLUENZA 10/26/2023   • Influenza, injectable, quadrivalent, preservative free 0.5 mL 09/10/2020   • Influenza, recombinant, quadrivalent,injectable, preservative free 09/05/2019   • Influenza, seasonal, injectable, preservative free 10/29/2024   • MMR 08/27/2015, 03/18/2016   • Tdap 08/27/2015, 09/05/2019   • Tuberculin Skin Test-PPD Intradermal 06/11/2018, 06/25/2018, 09/05/2019, 04/05/2021   • Varicella 09/30/2021     Objective   /70 (BP Location: Left arm, Patient Position: Sitting, Cuff Size: Standard)   Pulse 76   Temp 97.6 °F (36.4 °C) (Tympanic)   Resp 16   Ht 5' 6\" (1.676 m)   Wt 84.6 kg (186 lb 6.4 oz)   SpO2 98%   BMI 30.09 kg/m²     Physical Exam  Vitals and nursing note reviewed.   Constitutional:       Appearance: Normal appearance. She is well-developed.   HENT:      Head: Normocephalic and atraumatic.      Right Ear: External ear normal.      Left Ear: External ear normal.   Eyes:      Conjunctiva/sclera: Conjunctivae normal.   Cardiovascular:      Rate and Rhythm: Normal rate and regular rhythm.      Heart sounds: Normal heart sounds.   Pulmonary:      Effort: Pulmonary effort is normal.      Breath sounds: Normal breath sounds.   Musculoskeletal:         General: Normal range of motion.      Cervical back: Normal range of motion.   Skin:     General: Skin is warm and dry.   Neurological:      Mental Status: She is alert and oriented to person, place, and time.   Psychiatric:         Mood and Affect: Mood normal.         Behavior: Behavior normal.         "

## 2025-03-18 NOTE — ASSESSMENT & PLAN NOTE
- No longer taking Buspar.   - Anxiety is well controlled. She is taking OTC supplement with ashwagandha.  - Will continue to monitor.

## 2025-03-19 ENCOUNTER — TELEPHONE (OUTPATIENT)
Dept: FAMILY MEDICINE CLINIC | Facility: CLINIC | Age: 30
End: 2025-03-19

## 2025-03-27 ENCOUNTER — APPOINTMENT (OUTPATIENT)
Dept: LAB | Age: 30
End: 2025-03-27
Payer: COMMERCIAL

## 2025-03-27 DIAGNOSIS — Z11.1 SCREENING-PULMONARY TB: ICD-10-CM

## 2025-03-27 PROCEDURE — 36415 COLL VENOUS BLD VENIPUNCTURE: CPT

## 2025-03-27 PROCEDURE — 86480 TB TEST CELL IMMUN MEASURE: CPT

## 2025-03-28 LAB
GAMMA INTERFERON BACKGROUND BLD IA-ACNC: 0.04 IU/ML
M TB IFN-G BLD-IMP: NEGATIVE
M TB IFN-G CD4+ BCKGRND COR BLD-ACNC: 0.01 IU/ML
M TB IFN-G CD4+ BCKGRND COR BLD-ACNC: 0.01 IU/ML
MITOGEN IGNF BCKGRD COR BLD-ACNC: 9.96 IU/ML

## 2025-04-01 ENCOUNTER — RESULTS FOLLOW-UP (OUTPATIENT)
Dept: FAMILY MEDICINE CLINIC | Facility: CLINIC | Age: 30
End: 2025-04-01

## 2025-04-01 NOTE — TELEPHONE ENCOUNTER
Pt returned call looking for Mirella was not sure if I can relay the test results as the message on the chart was not mentioned. Tried to warm transfer the call to practice clinical went unanswered. Kindly call back the patient. Thanks

## 2025-04-17 PROBLEM — Z11.1 SCREENING-PULMONARY TB: Status: RESOLVED | Noted: 2025-03-18 | Resolved: 2025-04-17

## 2025-05-12 ENCOUNTER — PATIENT MESSAGE (OUTPATIENT)
Dept: FAMILY MEDICINE CLINIC | Facility: CLINIC | Age: 30
End: 2025-05-12

## 2025-05-12 NOTE — PATIENT COMMUNICATION
Pt was seen 3/18/25 for a pericardial cyst and she did have a CT scan done and needed to repeat it in 1 year. She is c/o pain and her follow up ct scan is not until August. She would like to have a sooner appointment.  Please advise

## 2025-05-12 NOTE — PATIENT COMMUNICATION
Patient calling to request a CT Scan of her chest for a pericardial cyst. Informed patient that Primary Care Provider will review her message and clinical staff will reach out to assist her. Patient expressed understanding.

## 2025-05-29 ENCOUNTER — OFFICE VISIT (OUTPATIENT)
Dept: OBGYN CLINIC | Facility: MEDICAL CENTER | Age: 30
End: 2025-05-29
Attending: NURSE PRACTITIONER
Payer: COMMERCIAL

## 2025-05-29 VITALS — WEIGHT: 184 LBS | HEIGHT: 66 IN | BODY MASS INDEX: 29.57 KG/M2

## 2025-05-29 DIAGNOSIS — R07.81 RIB PAIN: ICD-10-CM

## 2025-05-29 DIAGNOSIS — M79.89 SOFT TISSUE MASS: Primary | ICD-10-CM

## 2025-05-29 DIAGNOSIS — R07.89 COSTOCHONDRAL PAIN: ICD-10-CM

## 2025-05-29 PROCEDURE — 99244 OFF/OP CNSLTJ NEW/EST MOD 40: CPT | Performed by: STUDENT IN AN ORGANIZED HEALTH CARE EDUCATION/TRAINING PROGRAM

## 2025-05-29 NOTE — PROGRESS NOTES
Orthopedic Oncology Surgery Office Note  Vivi Isabel (29 y.o. female)  : 1995 Encounter Date: 2025  Encounter Department: [facility] DONTE Chaudhry DO, Orthopedic Surgeon  Orthopedic Oncology & Sarcoma Surgery   Phone:[phone] Fax:[phone]  Assessment & Plan  Soft tissue mass  Discussed with that patient that:  -Reviewed physical exam and imaging with patient at time of visit. Radiographic findings demonstrate no acute osseous abnormality, fracture or dislocation.   -likely the cause of the bump is due to an asymmetry in the rib that allows it to be more prominent to the surface of her body however recommending MRI for further imaging  -review of images shows that there is no tumor or osseous abnormality  -discussed that the pain that she feels may be from the way that the muscle travels over the bone with certain exercises and with specific movements  -recommended patient completing an MRI w woc of chest wall to have additional imaging to rule out a tumor  -patient will follow-up in the office after MRI     Orders:    MRI chest wall wo and w contrast; Future    Rib pain    Orders:    MRI chest wall wo and w contrast; Future    Costochondral pain               Diagnosis:  With the available evidence at this time, this appears stable/benign in nature, although without a biopsy-proven diagnosis, we are not able to know exactly what this is, lending to the need for further monitoring (self, exam, or imaging).    Monitoring Schedule:   N/a    Comorbidity, including: N/a    Surgical Planning:   No surgery planned at this time        Follow Up & Tasks:     Follow up:  Return for After MRI and Chest CT without have been completed.         Next visit:  Review CT chest woc  Review MRI chest wall w woc    ___________________________________________________________________________    History of Present Illness:     Vivi Isabel is a 29 y.o. female with  "history of soft tissue mass of left portion of chest over approximately the 3rd rib, just lateral to the sternum who presents for consultation at the request of Maine Akers CRNP  regarding left chest soft tissue mass. Patient states that she first noticed that she had pain to that area when she was lifting at the gym. Patient states that she had pain after lifting heavy weights. She states that she started to notice a lump that appeared several months later. She does not feel that the lump has continued to grow however she does still notice pain in that area when she is lifting heavy weights, when she runs too fast, or when she hugs someone.        At baseline patient gaits without any ambulatory assistance.  Denies constitutional symptoms such as fever, chills, night sweats, fatigue, weight gains/losses. Denies  chest pain/shortness of breath.  Patient Denies  personal history of cancer.    Occupation: Unknown    Review of Systems:   Allergies, medications, past medical/surgical/family/social history have been reviewed.  Complete 12 system review performed and found to be negative except: except as per mentioned in HPI.    Oncology and Treatment History:      Review of referring provider's records:  Referring provider: Maine Akers CRNP  Date: 3/18/25  Impression: referral to ortho surgery      Patient Care team:   Patient Care Team:  KAYLEE Ribeiro as PCP - General (Family Medicine)  Alin Bernal MD (Thoracic Surgery)     Oncology History    No history exists.       Physical Examination:     Height: 5' 6\" (167.6 cm)  Weight - Scale: 83.5 kg (184 lb)  BMI (Calculated): 29.7  BSA (Calculated - m2): 1.93 sq meters     There were no vitals filed for this visit.  Body mass index is 29.7 kg/m².    General: alert and oriented; no apparent distress.   Psychiatric: normal mood and affect  HEENT: NCAT. Head/neck - full range of motion.  Eyes clear, moist mucus membranes, hearing " intact  Cardiovascular: no chest pain, no palpitations, no discernable arrhythmia  Well Perfused peripherally, palpable distal pulse  Lungs: breathing comfortably; equal symmetric chest expansion. No audible wheezing or stridor  Abdomen: soft, non-tender, non-distended. no peritoneal signs  Skin: warm; dry; no lesions, rashes, petechiae or purpura; no clubbing, no cyanosis, no edema    Region: Left anterolateral aspect of chest   Inspection: no edema, skin abnormalities throughout   Palpation: NTTP to soft tissue mass    Range of motion of joints: WNL range of motion all extremities.   Motor strength: WNL all extremities.   Sensation: grossly intact to all extremities.    Pulses: intact   Gait: No assistive device    Special tests: None    Imaging Results:   All images personally reviewed today by Dr. Chaudhry    Study: CT chest w contrast  Date: 6/19/24  Report: I have read and agree with the radiologist report. and I have personally reviewed the imaging via [employer] PACS (Picture Archiving and Communication System) and my impression is as follows:  Impression:  No evidence of fracture or osseous lesion. No erosive changes. No abnormality of the second left rib. Correlate with ultrasound or MRI if palpable mass persists.    Study: XR left ribs  Date: 5/6/24  Report: I have read and agree with the radiologist report. and I have personally reviewed the imaging via [employer] PACS (Picture Archiving and Communication System) and my impression is as follows:  Impression:  No evidence of fracture or osseous lesion. No erosive changes. No abnormality of the second left rib. Correlate with ultrasound or MRI if palpable mass persists.        Pathology & Pertinent Laboratory Findings:      Pertinent laboratory findings:    Lab Results   Component Value Date/Time    ALKPHOS 49 01/24/2025 09:51 AM    ALKPHOS 71 09/03/2022 09:25 PM    ALB 4.2 01/24/2025 09:51 AM    ALB 3.7 09/03/2022 09:25 PM    TP 7.9 01/24/2025 09:51 AM    TP 7.6  09/03/2022 09:25 PM    WBC 4.79 01/24/2025 09:51 AM    RBC 4.49 01/24/2025 09:51 AM    HGB 13.8 01/24/2025 09:51 AM    HCT 42.4 01/24/2025 09:51 AM    RDW 12.4 01/24/2025 09:51 AM    LYMPHOPCT 40 01/24/2025 09:51 AM    NEUTROABS 2.47 01/24/2025 09:51 AM    CALCIUM 10.0 01/24/2025 09:51 AM    CALCIUM 9.4 09/03/2022 09:25 PM    TSH 2.01 12/01/2022 08:44 AM    HGBA1C 5.0 01/24/2025 09:51 AM       Pathology:    N/a    Microbiology:  Cultures: N/a    Medical, Surgical, Family, and Social History      Past Medical History[1]  Past Surgical History[2]  Current Medications[3]  Allergies[4]  Family History[5]  Social History     Socioeconomic History    Marital status: /Civil Union     Spouse name: Not on file    Number of children: Not on file    Years of education: Not on file    Highest education level: Not on file   Occupational History    Not on file   Tobacco Use    Smoking status: Never    Smokeless tobacco: Never   Vaping Use    Vaping status: Never Used   Substance and Sexual Activity    Alcohol use: Yes     Comment: socially    Drug use: Never    Sexual activity: Yes     Partners: Male     Birth control/protection: None   Other Topics Concern    Not on file   Social History Narrative    Not on file     Social Drivers of Health     Financial Resource Strain: Low Risk  (5/18/2021)    Overall Financial Resource Strain (CARDIA)     Difficulty of Paying Living Expenses: Not hard at all   Food Insecurity: No Food Insecurity (5/18/2021)    Hunger Vital Sign     Worried About Running Out of Food in the Last Year: Never true     Ran Out of Food in the Last Year: Never true   Transportation Needs: No Transportation Needs (5/18/2021)    PRAPARE - Transportation     Lack of Transportation (Medical): No     Lack of Transportation (Non-Medical): No   Physical Activity: Sufficiently Active (5/18/2021)    Exercise Vital Sign     Days of Exercise per Week: 3 days     Minutes of Exercise per Session: 60 min   Stress: Stress  Concern Present (9/14/2022)    [employer] [facility] [employer] [facility] - Occupational Stress Questionnaire     Feeling of Stress : Rather much   Social Connections: Socially Isolated (5/18/2021)    Social Connection and Isolation Panel     Frequency of Communication with Friends and Family: More than three times a week     Frequency of Social Gatherings with Friends and Family: Once a week     Attends Jew Services: Never     Active Member of Clubs or Organizations: No     Attends Club or Organization Meetings: Never     Marital Status: Never    Intimate Partner Violence: Not At Risk (5/18/2021)    Humiliation, Afraid, Rape, and Kick questionnaire     Fear of Current or Ex-Partner: No     Emotionally Abused: No     Physically Abused: No     Sexually Abused: No   Housing Stability: Not on file       This Visit:     Scribe Attestation      I,:  Debbie Sims am acting as a scribe while in the presence of the attending physician.:       I,:  Bud Chaudhry, DO personally performed the services described in this documentation    as scribed in my presence.:                       [1]   Past Medical History:  Diagnosis Date    Anxiety 09/05/2019    AV block, 1st degree     BMI 29.0-29.9,adult 2/24/2021    Chlamydia 2015    COVID-19 virus infection 01/11/2021    Obesity     SOB (shortness of breath) 02/23/2021   [2]   Past Surgical History:  Procedure Laterality Date    ELBOW SURGERY Right     SURGERY OF LIP      Piercing removal, pt reported   [3]   Current Outpatient Medications:     Vitamin D, Cholecalciferol, 25 MCG (1000 UT) TABS, Take 1,000 Units by mouth in the morning, Disp: , Rfl:     busPIRone (BUSPAR) 15 mg tablet, Take 1 tablet (15 mg total) by mouth 2 (two) times a day (Patient not taking: Reported on 5/29/2025), Disp: 180 tablet, Rfl: 0    Cholecalciferol (Vitamin D) 125 MCG (5000 UT) CAPS, Take 5,000 Units by mouth daily (Patient not taking: Reported on 1/23/2025), Disp: , Rfl:      Cholecalciferol (Vitamin D-3) 5000 UNIT/ML LIQD, , Disp: , Rfl:     ibuprofen (MOTRIN) 600 mg tablet, Take 1 tablet (600 mg total) by mouth every 8 (eight) hours as needed for mild pain (Patient not taking: Reported on 5/29/2025), Disp: 30 tablet, Rfl: 1    Prenatal Vit-Iron Carbonyl-FA (prenatal multivitamin) TABS, , Disp: , Rfl:   [4] No Known Allergies  [5]   Family History  Problem Relation Name Age of Onset    Diabetes Father Sky GuzmanAntony     Hypertension Father Sky GuzmanAntony

## 2025-06-01 ENCOUNTER — PATIENT MESSAGE (OUTPATIENT)
Dept: FAMILY MEDICINE CLINIC | Facility: CLINIC | Age: 30
End: 2025-06-01

## 2025-06-01 DIAGNOSIS — F41.9 ANXIETY: ICD-10-CM

## 2025-06-01 RX ORDER — BUSPIRONE HYDROCHLORIDE 15 MG/1
15 TABLET ORAL 2 TIMES DAILY
Qty: 180 TABLET | Refills: 0 | Status: SHIPPED | OUTPATIENT
Start: 2025-06-01

## 2025-06-14 ENCOUNTER — PATIENT MESSAGE (OUTPATIENT)
Dept: FAMILY MEDICINE CLINIC | Facility: CLINIC | Age: 30
End: 2025-06-14

## 2025-06-19 ENCOUNTER — HOSPITAL ENCOUNTER (OUTPATIENT)
Dept: RADIOLOGY | Facility: HOSPITAL | Age: 30
Discharge: HOME | End: 2025-06-19
Attending: THORACIC SURGERY (CARDIOTHORACIC VASCULAR SURGERY)
Payer: COMMERCIAL

## 2025-06-19 DIAGNOSIS — Q24.8 PERICARDIAL CYST: ICD-10-CM

## 2025-06-19 PROCEDURE — 71250 CT THORAX DX C-: CPT

## 2025-08-01 ENCOUNTER — PATIENT MESSAGE (OUTPATIENT)
Dept: FAMILY MEDICINE CLINIC | Facility: CLINIC | Age: 30
End: 2025-08-01

## 2025-08-08 ENCOUNTER — OFFICE VISIT (OUTPATIENT)
Dept: FAMILY MEDICINE CLINIC | Facility: CLINIC | Age: 30
End: 2025-08-08
Payer: COMMERCIAL

## 2025-08-08 VITALS
SYSTOLIC BLOOD PRESSURE: 112 MMHG | WEIGHT: 188.6 LBS | HEIGHT: 66 IN | TEMPERATURE: 98 F | HEART RATE: 70 BPM | BODY MASS INDEX: 30.31 KG/M2 | RESPIRATION RATE: 16 BRPM | DIASTOLIC BLOOD PRESSURE: 68 MMHG | OXYGEN SATURATION: 99 %

## 2025-08-08 DIAGNOSIS — Z13.1 DIABETES MELLITUS SCREENING: ICD-10-CM

## 2025-08-08 DIAGNOSIS — F41.9 ANXIETY: Primary | ICD-10-CM

## 2025-08-08 DIAGNOSIS — Q24.8 PERICARDIAL CYST: ICD-10-CM

## 2025-08-08 DIAGNOSIS — Z00.00 HEALTHCARE MAINTENANCE: ICD-10-CM

## 2025-08-08 DIAGNOSIS — R07.89 COSTOCHONDRAL PAIN: ICD-10-CM

## 2025-08-08 PROCEDURE — 99214 OFFICE O/P EST MOD 30 MIN: CPT | Performed by: NURSE PRACTITIONER

## 2025-08-19 ENCOUNTER — NURSE TRIAGE (OUTPATIENT)
Age: 30
End: 2025-08-19

## 2025-08-19 ENCOUNTER — PATIENT MESSAGE (OUTPATIENT)
Dept: FAMILY MEDICINE CLINIC | Facility: CLINIC | Age: 30
End: 2025-08-19

## 2025-08-19 ENCOUNTER — TELEMEDICINE (OUTPATIENT)
Dept: CARDIAC SURGERY | Facility: CLINIC | Age: 30
End: 2025-08-19
Payer: COMMERCIAL

## 2025-08-19 DIAGNOSIS — F41.9 ANXIETY: Primary | ICD-10-CM

## 2025-08-19 DIAGNOSIS — Q24.8 PERICARDIAL CYST: Primary | ICD-10-CM

## 2025-08-19 DIAGNOSIS — F41.9 ANXIETY: ICD-10-CM

## 2025-08-19 PROCEDURE — 99213 OFFICE O/P EST LOW 20 MIN: CPT

## 2025-08-19 RX ORDER — ESCITALOPRAM OXALATE 10 MG/1
10 TABLET ORAL DAILY
Qty: 90 TABLET | Refills: 0 | Status: SHIPPED | OUTPATIENT
Start: 2025-08-19 | End: 2025-08-19 | Stop reason: CLARIF

## 2025-08-19 RX ORDER — ESCITALOPRAM OXALATE 10 MG/1
10 TABLET ORAL DAILY
Qty: 90 TABLET | Refills: 0 | Status: SHIPPED | OUTPATIENT
Start: 2025-08-19